# Patient Record
Sex: MALE | Race: WHITE | NOT HISPANIC OR LATINO | Employment: OTHER | URBAN - METROPOLITAN AREA
[De-identification: names, ages, dates, MRNs, and addresses within clinical notes are randomized per-mention and may not be internally consistent; named-entity substitution may affect disease eponyms.]

---

## 2018-05-15 ENCOUNTER — CONVERSION ENCOUNTER (OUTPATIENT)
Dept: URGENT CARE | Facility: CLINIC | Age: 68
End: 2018-05-15

## 2019-06-03 VITALS
DIASTOLIC BLOOD PRESSURE: 80 MMHG | OXYGEN SATURATION: 100 % | SYSTOLIC BLOOD PRESSURE: 148 MMHG | WEIGHT: 211.38 LBS | BODY MASS INDEX: 33.97 KG/M2 | HEIGHT: 66 IN | HEART RATE: 89 BPM

## 2019-08-22 ENCOUNTER — OFFICE VISIT (OUTPATIENT)
Dept: NEUROLOGY | Facility: CLINIC | Age: 69
End: 2019-08-22

## 2019-08-22 VITALS
SYSTOLIC BLOOD PRESSURE: 136 MMHG | HEIGHT: 66 IN | HEART RATE: 85 BPM | WEIGHT: 214 LBS | BODY MASS INDEX: 34.39 KG/M2 | DIASTOLIC BLOOD PRESSURE: 77 MMHG

## 2019-08-22 DIAGNOSIS — G25.0 ESSENTIAL TREMOR: Primary | ICD-10-CM
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
## 2019-08-22 PROBLEM — E11.9 TYPE 2 DIABETES MELLITUS WITHOUT COMPLICATIONS: Status: ACTIVE | Noted: 2018-05-15

## 2019-08-22 PROBLEM — I10 HYPERTENSION: Status: ACTIVE | Noted: 2018-05-15

## 2019-08-22 PROCEDURE — 99203 OFFICE O/P NEW LOW 30 MIN: CPT | Performed by: PSYCHIATRY & NEUROLOGY

## 2019-08-22 RX ORDER — PRIMIDONE 50 MG/1
TABLET ORAL
Qty: 60 TABLET | Refills: 11 | Status: SHIPPED | OUTPATIENT
Start: 2019-08-22 | End: 2020-08-21

## 2019-08-22 RX ORDER — ASPIRIN 81 MG/1
81 TABLET, CHEWABLE ORAL EVERY OTHER DAY
COMMUNITY

## 2019-08-22 RX ORDER — SIMVASTATIN 40 MG
40 TABLET ORAL DAILY
Refills: 2 | COMMUNITY
Start: 2019-08-15

## 2019-08-22 RX ORDER — LISINOPRIL 10 MG/1
10 TABLET ORAL DAILY
Refills: 1 | COMMUNITY
Start: 2019-07-05

## 2019-08-22 RX ORDER — EZETIMIBE 10 MG/1
10 TABLET ORAL DAILY
Refills: 1 | COMMUNITY
Start: 2019-08-10

## 2019-08-22 RX ORDER — PRIMIDONE 50 MG/1
TABLET ORAL
Qty: 60 TABLET | Refills: 11 | Status: SHIPPED | OUTPATIENT
Start: 2019-08-22 | End: 2019-08-22 | Stop reason: SDUPTHER

## 2019-08-22 RX ORDER — IBUPROFEN 200 MG
200 TABLET ORAL EVERY 6 HOURS PRN
COMMUNITY

## 2019-08-22 NOTE — PROGRESS NOTES
Subjective:     Patient ID: Tone Weber is a 69 y.o. male.    New patient referred by Dr. Urbina for tremors.      Patient states both hands shake started over 20 years ago.   Now getting worse.  Mostly an action tremor, such as while eating, driving.  Unable to use as small screw . Now notes a little resting tremor.    Per his wife his sense of smell is poor. (always has had a poor sense of smell)  No dream motor activity.  Balance is fine. No trouble walking . No chronic constipation.  No trouble swallowing, rolling over in bed or walking up or down stairs    He had aneurysm repair 29 years ago with a metal ferrous clip.  Had a ruptured aneurysm.    Father has Parkinson's.  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  Symptoms started in his mid to late 50's ,  age 69 from MI, was in wheel chair. His tremor was resting tremor. He also had a stroke. No one else with tremor.  pts sisters do not have a tremor. No tremors in grandparents.       The following portions of the patient's history were reviewed and updated as appropriate: allergies, current medications, past family history, past medical history, past social history, past surgical history and problem list.      Family History   Problem Relation Age of Onset   • Heart disease Father    • Diabetes Father    • Parkinsonism Father        Past Medical History:   Diagnosis Date   • Diabetes (CMS/[facility])    • Dysphonia of organic tremor    • HTN (hypertension)    • Hyperlipidemia    • Hypertension    • Macrocytosis    • Renal insufficiency        Social History     Socioeconomic History   • Marital status:      Spouse name: Not on file   • Number of children: Not on file   • Years of education: Not on file   • Highest education level: Not on file   Tobacco Use   • Smoking status: Former Smoker   • Smokeless tobacco: Never Used   Substance and Sexual Activity   • Alcohol use: No     Frequency: Never   • Drug use: No         Current Outpatient  Medications:   •  aspirin 81 MG chewable tablet, Chew 81 mg Daily., Disp: , Rfl:   •  ezetimibe (ZETIA) 10 MG tablet, Take 10 mg by mouth Daily., Disp: , Rfl: 1  •  ibuprofen (ADVIL,MOTRIN) 200 MG tablet, Take 200 mg by mouth Every 6 (Six) Hours As Needed for Mild Pain ., Disp: , Rfl:   •  lisinopril (PRINIVIL,ZESTRIL) 10 MG tablet, Take 10 mg by mouth Daily., Disp: , Rfl: 1  •  metFORMIN (GLUCOPHAGE) 1000 MG tablet, TK 1 T PO BID WITH THE MORNING AND ASHLEE MEAL, Disp: , Rfl: 1  •  simvastatin (ZOCOR) 40 MG tablet, Take 40 mg by mouth Daily., Disp: , Rfl: 2    Review of Systems   Constitutional: Negative for appetite change, fatigue and fever.   HENT: Negative for postnasal drip and sinus pain.    Eyes: Negative for discharge and itching.   Respiratory: Negative for chest tightness and shortness of breath.    Cardiovascular: Negative for chest pain and leg swelling.   Gastrointestinal: Negative for constipation and diarrhea.   Endocrine: Negative for cold intolerance and heat intolerance.   Genitourinary: Negative for frequency and urgency.   Musculoskeletal: Negative for gait problem and joint swelling.   Neurological: Positive for tremors. Negative for syncope, light-headedness and headaches.   Psychiatric/Behavioral: Negative for behavioral problems, confusion and sleep disturbance.        I have reviewed ROS completed by medical assistant.     Objective:    Neurologic Exam     Mental Status   Oriented to person, place, and time.   Attention: normal. Concentration: normal.   Level of consciousness: alert  Knowledge: good.     Cranial Nerves   Cranial nerves II through XII intact.     CN III, IV, VI   Pupils are equal, round, and reactive to light.  Extraocular motions are normal.     Motor Exam   Muscle bulk: normal  Right arm tone: increased (possible cog wheel rigidity left >right)  Left arm tone: increased  Right arm pronator drift: absent    Strength   Strength 5/5 throughout.     Sensory Exam   Light touch  normal.   Vibration normal.     Gait, Coordination, and Reflexes     Gait  Gait: (decreassed arm swing on the left)    Coordination   Romberg: negative    Reflexes   Right biceps: 2+  Left biceps: 2+  Right patellar: 2+  Left patellar: 2+  Right achilles: 3+  Left achilles: 2+      Physical Exam   Constitutional: He is oriented to person, place, and time. He appears well-nourished.   HENT:   Head: Normocephalic.   Nose: Nose normal.   Mouth/Throat: Oropharynx is clear and moist.   Eyes: Conjunctivae and EOM are normal. Pupils are equal, round, and reactive to light.   Cardiovascular: Normal rate, regular rhythm and normal heart sounds.   Pulmonary/Chest: Effort normal and breath sounds normal.   Musculoskeletal: Normal range of motion. He exhibits no edema or deformity.   Neurological: He is oriented to person, place, and time. He has normal strength. He has a normal Romberg Test.   Reflex Scores:       Bicep reflexes are 2+ on the right side and 2+ on the left side.       Patellar reflexes are 2+ on the right side and 2+ on the left side.       Achilles reflexes are 3+ on the right side and 2+ on the left side.  Psychiatric: He has a normal mood and affect. His behavior is normal. Thought content normal.   Vitals reviewed.      Assessment/Plan:    Tone was seen today for tremors.    Diagnoses and all orders for this visit:    Essential tremor    The history is consistent with essential tremor. The minimal resting tremor and family history raises the possibility or early parkinson. Consider HANNAH scan but defer due to cost.   Will Rx mysoline for the tremor.   Return in one year for re evaluation.      This document has been electronically signed by Joseph Seipel, MD on August 22, 2019 1:26 PM

## 2020-03-25 ENCOUNTER — LAB REQUISITION (OUTPATIENT)
Dept: LAB | Facility: HOSPITAL | Age: 70
End: 2020-03-25

## 2020-03-25 DIAGNOSIS — Z00.00 ENCOUNTER FOR GENERAL ADULT MEDICAL EXAMINATION WITHOUT ABNORMAL FINDINGS: ICD-10-CM

## 2020-03-25 PROCEDURE — 87635 SARS-COV-2 COVID-19 AMP PRB: CPT | Performed by: EMERGENCY MEDICINE

## 2020-03-25 PROCEDURE — U0003 INFECTIOUS AGENT DETECTION BY NUCLEIC ACID (DNA OR RNA); SEVERE ACUTE RESPIRATORY SYNDROME CORONAVIRUS 2 (SARS-COV-2) (CORONAVIRUS DISEASE [COVID-19]), AMPLIFIED PROBE TECHNIQUE, MAKING USE OF HIGH THROUGHPUT TECHNOLOGIES AS DESCRIBED BY CMS-2020-01-R: HCPCS | Performed by: EMERGENCY MEDICINE

## 2020-04-06 LAB — SARS-COV-2 RNA RESP QL NAA+PROBE: DETECTED

## 2020-04-07 ENCOUNTER — HOSPITAL ENCOUNTER (INPATIENT)
Facility: HOSPITAL | Age: 70
LOS: 5 days | Discharge: HOME | End: 2020-04-12
Attending: INTERNAL MEDICINE | Admitting: INTERNAL MEDICINE

## 2020-04-07 ENCOUNTER — APPOINTMENT (OUTPATIENT)
Dept: GENERAL RADIOLOGY | Facility: HOSPITAL | Age: 70
End: 2020-04-07

## 2020-04-07 DIAGNOSIS — U07.1 PNEUMONIA DUE TO COVID-19 VIRUS: ICD-10-CM

## 2020-04-07 DIAGNOSIS — U07.1 COVID-19 VIRUS DETECTED: ICD-10-CM

## 2020-04-07 DIAGNOSIS — J12.82 PNEUMONIA DUE TO COVID-19 VIRUS: ICD-10-CM

## 2020-04-07 DIAGNOSIS — R06.00 DYSPNEA, UNSPECIFIED TYPE: Primary | ICD-10-CM

## 2020-04-07 DIAGNOSIS — A41.9 SEPSIS, DUE TO UNSPECIFIED ORGANISM, UNSPECIFIED WHETHER ACUTE ORGAN DYSFUNCTION PRESENT (HCC): ICD-10-CM

## 2020-04-07 PROBLEM — R25.1 DYSPHONIA OF ORGANIC TREMOR: Chronic | Status: ACTIVE | Noted: 2020-04-07

## 2020-04-07 PROBLEM — E78.5 HYPERLIPIDEMIA: Chronic | Status: ACTIVE | Noted: 2020-04-07

## 2020-04-07 PROBLEM — I48.91 NEW ONSET ATRIAL FIBRILLATION (HCC): Status: ACTIVE | Noted: 2020-04-07

## 2020-04-07 PROBLEM — R49.0 DYSPHONIA OF ORGANIC TREMOR: Chronic | Status: ACTIVE | Noted: 2020-04-07

## 2020-04-07 PROBLEM — J96.01 ACUTE RESPIRATORY FAILURE WITH HYPOXIA: Status: ACTIVE | Noted: 2020-04-07

## 2020-04-07 PROBLEM — E87.1 HYPONATREMIA: Status: ACTIVE | Noted: 2020-04-07

## 2020-04-07 LAB
ALBUMIN SERPL-MCNC: 3.9 G/DL (ref 3.5–5.2)
ALBUMIN/GLOB SERPL: 0.9 G/DL
ALP SERPL-CCNC: 166 U/L (ref 39–117)
ALT SERPL W P-5'-P-CCNC: 91 U/L (ref 1–41)
ANION GAP SERPL CALCULATED.3IONS-SCNC: 17 MMOL/L (ref 5–15)
ANISOCYTOSIS BLD QL: NORMAL
ARTERIAL PATENCY WRIST A: POSITIVE
AST SERPL-CCNC: 73 U/L (ref 1–40)
ATMOSPHERIC PRESS: ABNORMAL MM[HG]
BACTERIA UR QL AUTO: ABNORMAL /HPF
BASE EXCESS BLDA CALC-SCNC: -2.9 MMOL/L (ref 0–3)
BASOPHILS # BLD AUTO: 0 10*3/MM3 (ref 0–0.2)
BASOPHILS NFR BLD AUTO: 0.1 % (ref 0–1.5)
BDY SITE: ABNORMAL
BILIRUB SERPL-MCNC: 0.5 MG/DL (ref 0.2–1.2)
BILIRUB UR QL STRIP: NEGATIVE
BUN BLD-MCNC: 11 MG/DL (ref 8–23)
BUN/CREAT SERPL: 9.2 (ref 7–25)
CALCIUM SPEC-SCNC: 8.5 MG/DL (ref 8.6–10.5)
CHLORIDE SERPL-SCNC: 90 MMOL/L (ref 98–107)
CLARITY UR: CLEAR
CO2 BLDA-SCNC: 20.6 MMOL/L (ref 22–29)
CO2 SERPL-SCNC: 21 MMOL/L (ref 22–29)
COLOR UR: YELLOW
CREAT BLD-MCNC: 1.19 MG/DL (ref 0.76–1.27)
CRP SERPL-MCNC: 18.03 MG/DL (ref 0–0.5)
D DIMER PPP FEU-MCNC: 1.04 MCGFEU/ML (ref 0.17–0.59)
D-LACTATE SERPL-SCNC: 1.6 MMOL/L (ref 0.5–2)
D-LACTATE SERPL-SCNC: 2.8 MMOL/L (ref 0.5–2)
DEPRECATED RDW RBC AUTO: 43.8 FL (ref 37–54)
EOSINOPHIL # BLD AUTO: 0 10*3/MM3 (ref 0–0.4)
EOSINOPHIL NFR BLD AUTO: 0.1 % (ref 0.3–6.2)
ERYTHROCYTE [DISTWIDTH] IN BLOOD BY AUTOMATED COUNT: 13.8 % (ref 12.3–15.4)
FERRITIN SERPL-MCNC: 1258 NG/ML (ref 30–400)
GFR SERPL CREATININE-BSD FRML MDRD: 60 ML/MIN/1.73
GLOBULIN UR ELPH-MCNC: 4.3 GM/DL
GLUCOSE BLD-MCNC: 182 MG/DL (ref 65–99)
GLUCOSE BLDC GLUCOMTR-MCNC: 142 MG/DL (ref 70–105)
GLUCOSE BLDC GLUCOMTR-MCNC: 182 MG/DL (ref 70–105)
GLUCOSE UR STRIP-MCNC: ABNORMAL MG/DL
HCO3 BLDA-SCNC: 19.7 MMOL/L (ref 21–28)
HCT VFR BLD AUTO: 36.8 % (ref 37.5–51)
HEMODILUTION: NO
HGB BLD-MCNC: 13.3 G/DL (ref 13–17.7)
HGB UR QL STRIP.AUTO: ABNORMAL
HOLD SPECIMEN: NORMAL
HOROWITZ INDEX BLD+IHG-RTO: <21 %
HYALINE CASTS UR QL AUTO: ABNORMAL /LPF
KETONES UR QL STRIP: NEGATIVE
LACTATE HOLD SPECIMEN: NORMAL
LARGE PLATELETS: NORMAL
LDH SERPL-CCNC: 437 U/L (ref 135–225)
LEUKOCYTE ESTERASE UR QL STRIP.AUTO: NEGATIVE
LYMPHOCYTES # BLD AUTO: 0.8 10*3/MM3 (ref 0.7–3.1)
LYMPHOCYTES NFR BLD AUTO: 7.4 % (ref 19.6–45.3)
MCH RBC QN AUTO: 32.7 PG (ref 26.6–33)
MCHC RBC AUTO-ENTMCNC: 36.3 G/DL (ref 31.5–35.7)
MCV RBC AUTO: 90.1 FL (ref 79–97)
MODALITY: ABNORMAL
MONOCYTES # BLD AUTO: 0.7 10*3/MM3 (ref 0.1–0.9)
MONOCYTES NFR BLD AUTO: 6.6 % (ref 5–12)
NEUTROPHILS # BLD AUTO: 9.3 10*3/MM3 (ref 1.7–7)
NEUTROPHILS NFR BLD AUTO: 85.8 % (ref 42.7–76)
NITRITE UR QL STRIP: NEGATIVE
NRBC BLD AUTO-RTO: 0 /100 WBC (ref 0–0.2)
NT-PROBNP SERPL-MCNC: 97.2 PG/ML (ref 5–900)
PCO2 BLDA: 27.6 MM HG (ref 35–48)
PH BLDA: 7.46 PH UNITS (ref 7.35–7.45)
PH UR STRIP.AUTO: 5.5 [PH] (ref 5–8)
PLATELET # BLD AUTO: 264 10*3/MM3 (ref 140–450)
PMV BLD AUTO: 9.5 FL (ref 6–12)
PO2 BLDA: 70.1 MM HG (ref 83–108)
POTASSIUM BLD-SCNC: 4.4 MMOL/L (ref 3.5–5.2)
PROCALCITONIN SERPL-MCNC: 0.5 NG/ML (ref 0.1–0.25)
PROT SERPL-MCNC: 8.2 G/DL (ref 6–8.5)
PROT UR QL STRIP: ABNORMAL
RBC # BLD AUTO: 4.08 10*6/MM3 (ref 4.14–5.8)
RBC # UR: ABNORMAL /HPF
REF LAB TEST METHOD: ABNORMAL
SAO2 % BLDCOA: 95.1 % (ref 94–98)
SODIUM BLD-SCNC: 128 MMOL/L (ref 136–145)
SP GR UR STRIP: 1.02 (ref 1–1.03)
SQUAMOUS #/AREA URNS HPF: ABNORMAL /HPF
TROPONIN T SERPL-MCNC: <0.01 NG/ML (ref 0–0.03)
UROBILINOGEN UR QL STRIP: ABNORMAL
WBC MORPH BLD: NORMAL
WBC NRBC COR # BLD: 10.8 10*3/MM3 (ref 3.4–10.8)
WBC UR QL AUTO: ABNORMAL /HPF

## 2020-04-07 PROCEDURE — 86140 C-REACTIVE PROTEIN: CPT | Performed by: NURSE PRACTITIONER

## 2020-04-07 PROCEDURE — 83880 ASSAY OF NATRIURETIC PEPTIDE: CPT | Performed by: NURSE PRACTITIONER

## 2020-04-07 PROCEDURE — 82803 BLOOD GASES ANY COMBINATION: CPT

## 2020-04-07 PROCEDURE — 87040 BLOOD CULTURE FOR BACTERIA: CPT | Performed by: NURSE PRACTITIONER

## 2020-04-07 PROCEDURE — 85025 COMPLETE CBC W/AUTO DIFF WBC: CPT | Performed by: NURSE PRACTITIONER

## 2020-04-07 PROCEDURE — 84145 PROCALCITONIN (PCT): CPT | Performed by: NURSE PRACTITIONER

## 2020-04-07 PROCEDURE — 99223 1ST HOSP IP/OBS HIGH 75: CPT | Performed by: INTERNAL MEDICINE

## 2020-04-07 PROCEDURE — 93005 ELECTROCARDIOGRAM TRACING: CPT | Performed by: NURSE PRACTITIONER

## 2020-04-07 PROCEDURE — 81001 URINALYSIS AUTO W/SCOPE: CPT | Performed by: NURSE PRACTITIONER

## 2020-04-07 PROCEDURE — 82728 ASSAY OF FERRITIN: CPT | Performed by: NURSE PRACTITIONER

## 2020-04-07 PROCEDURE — 85379 FIBRIN DEGRADATION QUANT: CPT | Performed by: NURSE PRACTITIONER

## 2020-04-07 PROCEDURE — 99284 EMERGENCY DEPT VISIT MOD MDM: CPT

## 2020-04-07 PROCEDURE — 84484 ASSAY OF TROPONIN QUANT: CPT | Performed by: NURSE PRACTITIONER

## 2020-04-07 PROCEDURE — 94640 AIRWAY INHALATION TREATMENT: CPT

## 2020-04-07 PROCEDURE — 82962 GLUCOSE BLOOD TEST: CPT

## 2020-04-07 PROCEDURE — 94799 UNLISTED PULMONARY SVC/PX: CPT

## 2020-04-07 PROCEDURE — 83605 ASSAY OF LACTIC ACID: CPT

## 2020-04-07 PROCEDURE — 80053 COMPREHEN METABOLIC PANEL: CPT | Performed by: NURSE PRACTITIONER

## 2020-04-07 PROCEDURE — 83615 LACTATE (LD) (LDH) ENZYME: CPT | Performed by: NURSE PRACTITIONER

## 2020-04-07 PROCEDURE — 63710000001 INSULIN LISPRO (HUMAN) PER 5 UNITS: Performed by: NURSE PRACTITIONER

## 2020-04-07 PROCEDURE — 36600 WITHDRAWAL OF ARTERIAL BLOOD: CPT

## 2020-04-07 PROCEDURE — 83036 HEMOGLOBIN GLYCOSYLATED A1C: CPT | Performed by: NURSE PRACTITIONER

## 2020-04-07 PROCEDURE — 85007 BL SMEAR W/DIFF WBC COUNT: CPT | Performed by: NURSE PRACTITIONER

## 2020-04-07 PROCEDURE — 71045 X-RAY EXAM CHEST 1 VIEW: CPT

## 2020-04-07 RX ORDER — CHOLECALCIFEROL (VITAMIN D3) 125 MCG
5 CAPSULE ORAL NIGHTLY PRN
Status: DISCONTINUED | OUTPATIENT
Start: 2020-04-07 | End: 2020-04-12 | Stop reason: HOSPADM

## 2020-04-07 RX ORDER — HYDROXYCHLOROQUINE SULFATE 200 MG/1
400 TABLET, FILM COATED ORAL EVERY 12 HOURS SCHEDULED
Status: COMPLETED | OUTPATIENT
Start: 2020-04-07 | End: 2020-04-08

## 2020-04-07 RX ORDER — HYDROCODONE BITARTRATE AND ACETAMINOPHEN 10; 325 MG/1; MG/1
1 TABLET ORAL EVERY 6 HOURS PRN
Status: DISCONTINUED | OUTPATIENT
Start: 2020-04-07 | End: 2020-04-12 | Stop reason: HOSPADM

## 2020-04-07 RX ORDER — HYDROXYCHLOROQUINE SULFATE 200 MG/1
200 TABLET, FILM COATED ORAL EVERY 12 HOURS SCHEDULED
Status: COMPLETED | OUTPATIENT
Start: 2020-04-08 | End: 2020-04-12

## 2020-04-07 RX ORDER — ACETAMINOPHEN 160 MG/5ML
650 SOLUTION ORAL EVERY 4 HOURS PRN
Status: DISCONTINUED | OUTPATIENT
Start: 2020-04-07 | End: 2020-04-12 | Stop reason: HOSPADM

## 2020-04-07 RX ORDER — BISACODYL 5 MG/1
5 TABLET, DELAYED RELEASE ORAL DAILY PRN
Status: DISCONTINUED | OUTPATIENT
Start: 2020-04-07 | End: 2020-04-12 | Stop reason: HOSPADM

## 2020-04-07 RX ORDER — LISINOPRIL 5 MG/1
10 TABLET ORAL DAILY
Status: DISCONTINUED | OUTPATIENT
Start: 2020-04-07 | End: 2020-04-12 | Stop reason: HOSPADM

## 2020-04-07 RX ORDER — ACETAMINOPHEN 500 MG
1000 TABLET ORAL ONCE
Status: COMPLETED | OUTPATIENT
Start: 2020-04-07 | End: 2020-04-07

## 2020-04-07 RX ORDER — HYDROCODONE BITARTRATE AND ACETAMINOPHEN 5; 325 MG/1; MG/1
1 TABLET ORAL EVERY 6 HOURS PRN
Status: DISCONTINUED | OUTPATIENT
Start: 2020-04-07 | End: 2020-04-12 | Stop reason: HOSPADM

## 2020-04-07 RX ORDER — DEXTROSE MONOHYDRATE 25 G/50ML
25 INJECTION, SOLUTION INTRAVENOUS
Status: DISCONTINUED | OUTPATIENT
Start: 2020-04-07 | End: 2020-04-12 | Stop reason: HOSPADM

## 2020-04-07 RX ORDER — SODIUM CHLORIDE 9 MG/ML
50 INJECTION, SOLUTION INTRAVENOUS CONTINUOUS
Status: DISCONTINUED | OUTPATIENT
Start: 2020-04-07 | End: 2020-04-09

## 2020-04-07 RX ORDER — ATORVASTATIN CALCIUM 20 MG/1
20 TABLET, FILM COATED ORAL DAILY
Status: DISCONTINUED | OUTPATIENT
Start: 2020-04-07 | End: 2020-04-12 | Stop reason: HOSPADM

## 2020-04-07 RX ORDER — ALUMINA, MAGNESIA, AND SIMETHICONE 2400; 2400; 240 MG/30ML; MG/30ML; MG/30ML
15 SUSPENSION ORAL EVERY 6 HOURS PRN
Status: DISCONTINUED | OUTPATIENT
Start: 2020-04-07 | End: 2020-04-12 | Stop reason: HOSPADM

## 2020-04-07 RX ORDER — ACETAMINOPHEN 650 MG/1
650 SUPPOSITORY RECTAL EVERY 4 HOURS PRN
Status: DISCONTINUED | OUTPATIENT
Start: 2020-04-07 | End: 2020-04-12 | Stop reason: HOSPADM

## 2020-04-07 RX ORDER — SODIUM CHLORIDE 0.9 % (FLUSH) 0.9 %
10 SYRINGE (ML) INJECTION AS NEEDED
Status: DISCONTINUED | OUTPATIENT
Start: 2020-04-07 | End: 2020-04-12 | Stop reason: HOSPADM

## 2020-04-07 RX ORDER — NICOTINE POLACRILEX 4 MG
15 LOZENGE BUCCAL
Status: DISCONTINUED | OUTPATIENT
Start: 2020-04-07 | End: 2020-04-12 | Stop reason: HOSPADM

## 2020-04-07 RX ORDER — ACETAMINOPHEN 325 MG/1
650 TABLET ORAL EVERY 4 HOURS PRN
Status: DISCONTINUED | OUTPATIENT
Start: 2020-04-07 | End: 2020-04-12 | Stop reason: HOSPADM

## 2020-04-07 RX ORDER — ONDANSETRON 4 MG/1
4 TABLET, FILM COATED ORAL EVERY 6 HOURS PRN
Status: DISCONTINUED | OUTPATIENT
Start: 2020-04-07 | End: 2020-04-12 | Stop reason: HOSPADM

## 2020-04-07 RX ORDER — AZITHROMYCIN 250 MG/1
500 TABLET, FILM COATED ORAL DAILY
Status: COMPLETED | OUTPATIENT
Start: 2020-04-07 | End: 2020-04-07

## 2020-04-07 RX ORDER — PRIMIDONE 50 MG/1
50 TABLET ORAL NIGHTLY
Status: DISCONTINUED | OUTPATIENT
Start: 2020-04-07 | End: 2020-04-12 | Stop reason: HOSPADM

## 2020-04-07 RX ORDER — ONDANSETRON 2 MG/ML
4 INJECTION INTRAMUSCULAR; INTRAVENOUS EVERY 6 HOURS PRN
Status: DISCONTINUED | OUTPATIENT
Start: 2020-04-07 | End: 2020-04-12 | Stop reason: HOSPADM

## 2020-04-07 RX ORDER — ALBUTEROL SULFATE 90 UG/1
2 AEROSOL, METERED RESPIRATORY (INHALATION)
Status: DISCONTINUED | OUTPATIENT
Start: 2020-04-07 | End: 2020-04-12 | Stop reason: HOSPADM

## 2020-04-07 RX ORDER — AZITHROMYCIN 250 MG/1
250 TABLET, FILM COATED ORAL EVERY 24 HOURS
Status: DISCONTINUED | OUTPATIENT
Start: 2020-04-08 | End: 2020-04-08

## 2020-04-07 RX ORDER — SODIUM CHLORIDE 0.9 % (FLUSH) 0.9 %
10 SYRINGE (ML) INJECTION EVERY 12 HOURS SCHEDULED
Status: DISCONTINUED | OUTPATIENT
Start: 2020-04-07 | End: 2020-04-12 | Stop reason: HOSPADM

## 2020-04-07 RX ORDER — BISACODYL 10 MG
10 SUPPOSITORY, RECTAL RECTAL DAILY PRN
Status: DISCONTINUED | OUTPATIENT
Start: 2020-04-07 | End: 2020-04-12 | Stop reason: HOSPADM

## 2020-04-07 RX ADMIN — INSULIN LISPRO 2 UNITS: 100 INJECTION, SOLUTION INTRAVENOUS; SUBCUTANEOUS at 20:58

## 2020-04-07 RX ADMIN — HYDROXYCHLOROQUINE SULFATE 400 MG: 200 TABLET, FILM COATED ORAL at 20:57

## 2020-04-07 RX ADMIN — SODIUM CHLORIDE 1000 ML: 900 INJECTION, SOLUTION INTRAVENOUS at 12:23

## 2020-04-07 RX ADMIN — SODIUM CHLORIDE 100 ML/HR: 900 INJECTION, SOLUTION INTRAVENOUS at 18:25

## 2020-04-07 RX ADMIN — ATORVASTATIN CALCIUM 20 MG: 20 TABLET, FILM COATED ORAL at 17:31

## 2020-04-07 RX ADMIN — LISINOPRIL 10 MG: 5 TABLET ORAL at 17:31

## 2020-04-07 RX ADMIN — Medication 10 ML: at 21:52

## 2020-04-07 RX ADMIN — PRIMIDONE 50 MG: 50 TABLET ORAL at 20:57

## 2020-04-07 RX ADMIN — AZITHROMYCIN MONOHYDRATE 500 MG: 250 TABLET ORAL at 17:31

## 2020-04-07 RX ADMIN — ALBUTEROL SULFATE 2 PUFF: 90 AEROSOL, METERED RESPIRATORY (INHALATION) at 18:58

## 2020-04-07 RX ADMIN — ACETAMINOPHEN 1000 MG: 500 TABLET, FILM COATED ORAL at 14:33

## 2020-04-07 RX ADMIN — Medication 10 ML: at 14:33

## 2020-04-07 NOTE — ED NOTES
POC lactic performed plasma lactic not needed per provider     Romina Acevedo, RN  04/07/20 4223

## 2020-04-07 NOTE — PLAN OF CARE
Problem: Patient Care Overview  Goal: Plan of Care Review  Outcome: Ongoing (interventions implemented as appropriate)  Flowsheets (Taken 4/7/2020 1583)  Progress: improving  Outcome Summary: Pt given 1000mL NS bolus, Pt on 2L oxygen per NC with O2 of 97-99%, pt states he feels better, tylenol 1000mg given for 101.7 temperature, zithromax started. Continue to monitor.

## 2020-04-07 NOTE — H&P
[facility]      Patient Name: Tone Weber  : 1950  MRN: [id number]  Primary Care Physician: Ramses Auguste MD  Date of admission: 2020    Patient Care Team:  Ramses Auguste MD as PCP - General (Family Medicine)          Subjective   History Present Illness     Chief Complaint:   Chief Complaint   Patient presents with   • increased SOA Positive COVID test from Health Dept       Mr. Weber is a 69 y.o. male with PMH of HTN, HLD, DM II, and essential tremors who presented to [facility] ER 20 with complaints of increased shortness of breath and weakness. He has positive Covid-19 infection resulted on 20. He has had intermittent fever, cough, mild shortness of breath, and intermittent diarrhea for the last 2 weeks. Originally his PCP collected flu and strep swabs that were negative. He was referred to the health department and had Covid-19 swab on 3/25/20. He has been taking advil every 4 hours as needed for fever. He stated his shortness of breath has worsened over the last couple of days and became severe today. He stated he could not recline and had to sit or stand straight up in order to breathe. He became very short of breath and weak after taking a shower. His wife brought him to the ER.     In the ER the patient required supplemental O2 2L. ABG reviewed with ph 7.46, CO2 27.6, O2 70, HCO3 19.7. CXR showed patchy linear opacity lung bases could be seen with atelectasis or developing pneumonia. Chronic changes are also not excluded. EKG showed atrial fibrillation, rate controlled at 97. He denied any previous history of atrial fibrillation. Labs reviewed and showed labs consistent with Covid-19 with normal WBC 10.8, low lymphocytes 7.4, procalcitonin 0.50, CRP 18. Temperature was 100. He was given 1G tylenol and 1L IVFs. He was admitted for further treatment of acute respiratory failure requiring supplemental oxygen secondary to Covid-19, developing  pneumonia.     Review of Systems   Constitution: Positive for fever and malaise/fatigue.   HENT: Negative.    Eyes: Negative.    Cardiovascular: Negative.    Respiratory: Positive for cough and shortness of breath.    Endocrine: Negative.    Hematologic/Lymphatic: Negative.    Skin: Negative.    Musculoskeletal: Negative.    Gastrointestinal: Positive for diarrhea.   Genitourinary: Negative.    Neurological: Positive for weakness.   Psychiatric/Behavioral: Negative.    Allergic/Immunologic: Negative.    All other systems reviewed and are negative.          Personal History     Past Medical History:   Past Medical History:   Diagnosis Date   • Diabetes (CMS/HCC)    • Dysphonia of organic tremor    • HTN (hypertension)    • Hyperlipidemia    • Hypertension    • Macrocytosis    • Renal insufficiency        Surgical History:    History reviewed. No pertinent surgical history.        Family History: family history includes Diabetes in his father; Heart disease in his father; Parkinsonism in his father.     Social History:  reports that he has quit smoking. He has never used smokeless tobacco. He reports that he does not drink alcohol or use drugs.      Medications:  Prior to Admission medications    Medication Sig Start Date End Date Taking? Authorizing Provider   ezetimibe (ZETIA) 10 MG tablet Take 10 mg by mouth Daily. 8/10/19  Yes Lindy Hidalgo MD   lisinopril (PRINIVIL,ZESTRIL) 10 MG tablet Take 10 mg by mouth Daily. 7/5/19  Yes Lindy Hidalgo MD   metFORMIN (GLUCOPHAGE) 1000 MG tablet Take 1,000 mg by mouth 2 (Two) Times a Day With Meals.   Yes Lindy Hidalgo MD   primidone (MYSOLINE) 50 MG tablet TAKE 1 TABLET BY MOUTH AT BEDTIME. MAY INCREASE TO 2 TABLET AT BEDTIME AFTER 2 WEEKS 8/22/19  Yes Seipel, Joseph F, MD   simvastatin (ZOCOR) 40 MG tablet Take 40 mg by mouth Daily. 8/15/19  Yes Lindy Hidalgo MD   aspirin 81 MG chewable tablet Chew 81 mg Daily.    Lindy Hidalgo MD      ibuprofen (ADVIL,MOTRIN) 200 MG tablet Take 200 mg by mouth Every 6 (Six) Hours As Needed for Mild Pain .    ProviderLindy MD   metFORMIN (GLUCOPHAGE) 1000 MG tablet TK 1 T PO BID WITH THE MORNING AND ASHLEE MEAL 7/5/19 4/7/20  Provider, MD Lindy       Allergies:  No Known Allergies    Objective   Objective     Vital Signs  Temp:  [100 °F (37.8 °C)] 100 °F (37.8 °C)  Heart Rate:  [103-112] 103  Resp:  [20-36] 20  BP: (163-200)/(68-80) 163/68  SpO2:  [89 %-100 %] 100 %  on  Flow (L/min):  [2] 2;      Body mass index is 30.05 kg/m².    Physical Exam   Constitutional: He is oriented to person, place, and time. He appears well-developed and well-nourished. No distress.   HENT:   Head: Normocephalic and atraumatic.   Right Ear: External ear normal.   Left Ear: External ear normal.   Nose: Nose normal.   Mouth/Throat: Oropharynx is clear and moist. No oropharyngeal exudate.   Eyes: Pupils are equal, round, and reactive to light. Conjunctivae and EOM are normal. Right eye exhibits no discharge. Left eye exhibits no discharge. No scleral icterus.   Neck: Normal range of motion. Neck supple. No JVD present. No tracheal deviation present. No thyromegaly present.   Cardiovascular: Normal rate, normal heart sounds and intact distal pulses. Exam reveals no gallop and no friction rub.   No murmur heard.  Tachycardic on exertion   Pulmonary/Chest: Effort normal. No stridor. No respiratory distress. He has no wheezes. He has no rales. He exhibits no tenderness.   Few diffuse rhonchi were heard.  There is no fremitus or egophony.   Abdominal: Soft. Bowel sounds are normal. He exhibits no distension and no mass. There is no tenderness. There is no rebound and no guarding. No hernia.   Musculoskeletal: Normal range of motion. He exhibits no edema, tenderness or deformity.   Lymphadenopathy:     He has no cervical adenopathy.   Neurological: He is alert and oriented to person, place, and time. No cranial nerve deficit or  sensory deficit. He exhibits normal muscle tone. Coordination normal.   Skin: Skin is warm and dry. No rash noted. He is not diaphoretic. No erythema.   Psychiatric: He has a normal mood and affect. His behavior is normal.   Nursing note and vitals reviewed.       Results Review:  I have personally reviewed most recent cardiac tracings, lab results, microbiology results and radiology images and interpretations and agree with findings.     Results from last 7 days   Lab Units 04/07/20  1148   WBC 10*3/mm3 10.80   HEMOGLOBIN g/dL 13.3   HEMATOCRIT % 36.8*   PLATELETS 10*3/mm3 264     Results from last 7 days   Lab Units 04/07/20  1148   SODIUM mmol/L 128*   POTASSIUM mmol/L 4.4   CHLORIDE mmol/L 90*   CO2 mmol/L 21.0*   BUN mg/dL 11   CREATININE mg/dL 1.19   GLUCOSE mg/dL 182*   CALCIUM mg/dL 8.5*   ALT (SGPT) U/L 91*   AST (SGOT) U/L 73*   TROPONIN T ng/mL <0.010   PROBNP pg/mL 97.2   PROCALCITONIN ng/mL 0.50*     Estimated Creatinine Clearance: 66.8 mL/min (by C-G formula based on SCr of 1.19 mg/dL).  Brief Urine Lab Results  (Last result in the past 365 days)      Color   Clarity   Blood   Leuk Est   Nitrite   Protein   CREAT   Urine HCG        04/07/20 1308 Yellow Clear Trace Negative Negative 100 mg/dL (2+)               Microbiology Results (last 10 days)     ** No results found for the last 240 hours. **          ECG/EMG Results (most recent)     Procedure Component Value Units Date/Time    ECG 12 Lead [[id number]] Collected:  04/07/20 1153     Updated:  04/07/20 1155    Narrative:       HEART RATE= 97  bpm  RR Interval= 617  ms  MO Interval=   ms  P Horizontal Axis=   deg  P Front Axis=   deg  QRSD Interval= 81  ms  QT Interval= 343  ms  QRS Axis= -51  deg  T Wave Axis= 70  deg  - ABNORMAL ECG -  Atrial fibrillation  LAD, consider left anterior fascicular block  Consider anterior infarct  No previous ECG available for comparison  Electronically Signed By:   Date and Time of Study: 2020-04-07 11:53:51                      Xr Chest 1 View    Result Date: 4/7/2020  No prior for comparison. Patchy linear opacity lung bases could be seen with atelectasis or developing pneumonia. Chronic changes are also not excluded.  Electronically Signed By-Marcella Downs MD On:4/7/2020 12:39 PM This report was finalized on [id number] by  Marcella Downs MD.        Estimated Creatinine Clearance: 66.8 mL/min (by C-G formula based on SCr of 1.19 mg/dL).    Assessment/Plan   Assessment/Plan       Active Hospital Problems    Diagnosis  POA   • **Acute respiratory failure with hypoxia (CMS/HCC) [J96.01]  Yes     Priority: High   • COVID-19 virus detected [U07.1]  Yes     Priority: High   • Pneumonia due to COVID-19 virus [U07.1, J12.89]  Yes     Priority: High   • New onset atrial fibrillation (CMS/HCC) [I48.91]  Yes     Priority: High   • Hyponatremia [E87.1]  Yes     Priority: Medium   • Hyperlipidemia [E78.5]  Yes   • Dysphonia of organic tremor [R25.1, R49.0]  Yes   • Hypertension [I10]  Yes   • Type 2 diabetes mellitus without complications (CMS/HCC) [E11.9]  Yes      Resolved Hospital Problems   No resolved problems to display.       Acute hypoxic respiratory failure secondary to Covid-19 infection, pneumonia  -requiring 2L supplemental O2 when normally on room air  -ABG reviewed with ph 7.46, CO2 27.6, O2 70, HCO3 19.7  -CXR showed patchy linear opacity lung bases could be seen with atelectasis or developing pneumonia.  -known positive Covid-19  -WBC 10.8, lymphocytes 7.4, procalcitonin 0.50, CRP 18, ferritin 1258,   -temp 100  -antipyretics, inhalers  -DuoNeb breathing treatments every 4 hours while awake as needed    Pneumonia due to Covid-19  -CXR as above  -start Zithromax  -supportive care as above    Positive Covid-19 infection  -swab from 3/25/20 resulted positive on 4/5/20 per pt report and medical chart review (from [facility])  -enhanced droplet precautions   -Treatment as above with the addition of  hydroxychloroquine 400 mg twice daily today, then 200 mg twice daily for 4 additional days    Atrial fibrillation - new onset  -EKG showed rate controlled afib 87  -Plts are normal but could worsen with Covid-19 infection so will hold off on lovenox for now    Hyponatremia  -Na 128  -given 1L IVFs in ER, will hold off on giving further fluids due to risk of fluid overload with Covid-19  -monitor BMP    Hypertension  -cont home lisinopril    Hyperlipidemia  -cont home statin    DM Type II  -hold metformin while inpatient. Add SSI AC/HS    Essential tremor  -cot home primidone         VTE Prophylaxis -   Mechanical Order History:      Ordered        04/07/20 1330  Place Sequential Compression Device  Once         04/07/20 1330  Maintain Sequential Compression Device  Continuous                 Pharmalogical Order History:     None          CODE STATUS:    Code Status and Medical Interventions:   Ordered at: 04/07/20 1330     Level Of Support Discussed With:    Patient     Code Status:    CPR     Medical Interventions (Level of Support Prior to Arrest):    Full       This patient has been examined wearing appropriate Personal Protective Equipment. 04/07/20 was worn to see the patient.      I discussed the patient's findings and my recommendations with patient.  Have seen and examined this patient personally and the findings are as outlined above.      Electronically signed by MIRIAN Valentin, 04/07/20, 1:30 PM.  [facility]ist Team

## 2020-04-07 NOTE — NURSING NOTE
Sent private chat message to confirm order for 500cc bolus and NS infusion of 125mL/hr. Dr. White wanted to cancel the 500mL bolus and change the infusion to 100mL/hr.

## 2020-04-07 NOTE — ED PROVIDER NOTES
Subjective   69-year-old  male presents to the emergency room with complaint of increased shortness of breath.  Patient states that he has been sick since March 25 at which time he had a coronavirus testing done by the state and was told that his test was positive on April 5.  Wife reports to nurse that today is the first day that patient was able to bathe and shower and when he got out of shower had increased trouble breathing and felt like he could not catch his breath.  He was reported to the nurse that wife brought him to ER at the encouragement of the primary care physician's office.  Onset: March 25  Location: Generalized body aches and chest tightness  Duration: 10 days plus  Character: Worsening shortness of breath  Aggravating/Alleviating Factors: Movement and exertion and bathing/Tylenol  Radiation: Total body  Severity: Severe            Review of Systems   Constitutional: Positive for activity change, appetite change, chills, fatigue and fever.   Respiratory: Positive for chest tightness and shortness of breath.    Gastrointestinal: Positive for nausea.   Genitourinary: Negative.    Musculoskeletal: Positive for arthralgias, back pain and myalgias.   Skin: Positive for pallor.   Neurological: Negative.    Hematological: Negative.    Psychiatric/Behavioral: Negative.        Past Medical History:   Diagnosis Date   • Diabetes (CMS/HCC)    • Dysphonia of organic tremor    • HTN (hypertension)    • Hyperlipidemia    • Hypertension    • Macrocytosis    • Renal insufficiency        No Known Allergies    History reviewed. No pertinent surgical history.    Family History   Problem Relation Age of Onset   • Heart disease Father    • Diabetes Father    • Parkinsonism Father        Social History     Socioeconomic History   • Marital status:      Spouse name: Not on file   • Number of children: Not on file   • Years of education: Not on file   • Highest education level: Not on file   Tobacco Use   •  Smoking status: Former Smoker   • Smokeless tobacco: Never Used   Substance and Sexual Activity   • Alcohol use: No     Frequency: Never   • Drug use: No           Objective   Physical Exam   Constitutional: He appears well-developed and well-nourished. He appears distressed.   HENT:   Head: Normocephalic and atraumatic.   Eyes: Pupils are equal, round, and reactive to light.   Neck: Normal range of motion. Neck supple.   Cardiovascular: S1 normal, S2 normal and normal heart sounds. An irregularly irregular rhythm present. Tachycardia present.   Pulses:       Carotid pulses are 1+ on the right side, and 1+ on the left side.       Radial pulses are 1+ on the right side, and 1+ on the left side.        Femoral pulses are 1+ on the right side, and 1+ on the left side.       Popliteal pulses are 1+ on the right side, and 1+ on the left side.        Dorsalis pedis pulses are 1+ on the right side, and 1+ on the left side.        Posterior tibial pulses are 1+ on the right side, and 1+ on the left side.   Pulmonary/Chest: Tachypnea noted. He is in respiratory distress. He has decreased breath sounds in the right lower field and the left lower field. He has wheezes in the right middle field and the left middle field. He has rhonchi in the right lower field. He has rales in the right lower field and the left lower field.   Skin: He is diaphoretic.   Vitals reviewed.      Procedures           ED Course        Xr Chest 1 View    Result Date: 4/7/2020  No prior for comparison. Patchy linear opacity lung bases could be seen with atelectasis or developing pneumonia. Chronic changes are also not excluded.  Electronically Signed By-Marcella Downs MD On:4/7/2020 12:39 PM This report was finalized on [id number] by  Marcella Downs MD.      Labs Reviewed   COMPREHENSIVE METABOLIC PANEL - Abnormal; Notable for the following components:       Result Value    Glucose 182 (*)     Sodium 128 (*)     Chloride 90 (*)     CO2 21.0 (*)      "Calcium 8.5 (*)     ALT (SGPT) 91 (*)     AST (SGOT) 73 (*)     Alkaline Phosphatase 166 (*)     eGFR Non African Amer 60 (*)     Anion Gap 17.0 (*)     All other components within normal limits    Narrative:     GFR Normal >60  Chronic Kidney Disease <60  Kidney Failure <15     D-DIMER, QUANTITATIVE - Abnormal; Notable for the following components:    D-Dimer, Quantitative 1.04 (*)     All other components within normal limits    Narrative:     Reference Range  --------------------------------------------------------------------     < 0.50   Negative Predictive Value  0.50-0.59   Indeterminate    >= 0.60   Probable VTE             A very low percentage of patients with DVT may yield D-Dimer results   below the cut-off of 0.50 MCGFEU/mL.  This is known to be more   prevalent in patients with distal DVT.             Results of this test should always be interpreted in conjunction with   the patient's medical history, clinical presentation and other   findings.  Clinical diagnosis should not be based on the result of   IN[place]OVANCE D-Dimer alone.   PROCALCITONIN - Abnormal; Notable for the following components:    Procalcitonin 0.50 (*)     All other components within normal limits    Narrative:     As a Marker for Sepsis (Non-Neonates):   1. <0.5 ng/mL represents a low risk of severe sepsis and/or septic shock.  1. >2 ng/mL represents a high risk of severe sepsis and/or septic shock.    As a Marker for Lower Respiratory Tract Infections that require antibiotic therapy:  PCT on Admission     Antibiotic Therapy             6-12 Hrs later  > 0.5                Strongly Recommended            >0.25 - <0.5         Recommended  0.1 - 0.25           Discouraged                   Remeasure/reassess PCT  <0.1                 Strongly Discouraged          Remeasure/reassess PCT      As 28 day mortality risk marker: \"Change in Procalcitonin Result\" (> 80 % or <=80 %) if Day 0 (or Day 1) and Day 4 values are available. Refer to " [email or web address]-pct-[email or web address]/   Change in PCT <=80 %   A decrease of PCT levels below or equal to 80 % defines a positive change in PCT test result representing a higher risk for 28-day all-cause mortality of patients diagnosed with severe sepsis or septic shock.  Change in PCT > 80 %   A decrease of PCT levels of more than 80 % defines a negative change in PCT result representing a lower risk for 28-day all-cause mortality of patients diagnosed with severe sepsis or septic shock.                Results may be falsely decreased if patient taking Biotin.    C-REACTIVE PROTEIN - Abnormal; Notable for the following components:    C-Reactive Protein 18.03 (*)     All other components within normal limits   CBC WITH AUTO DIFFERENTIAL - Abnormal; Notable for the following components:    RBC 4.08 (*)     Hematocrit 36.8 (*)     MCHC 36.3 (*)     Neutrophil % 85.8 (*)     Lymphocyte % 7.4 (*)     Eosinophil % 0.1 (*)     Neutrophils, Absolute 9.30 (*)     All other components within normal limits   BLOOD GAS, ARTERIAL - Abnormal; Notable for the following components:    pH, Arterial 7.462 (*)     pCO2, Arterial 27.6 (*)     pO2, Arterial 70.1 (*)     HCO3, Arterial 19.7 (*)     Base Excess, Arterial -2.9 (*)     CO2 Content 20.6 (*)     All other components within normal limits   BNP (IN-HOUSE) - Normal    Narrative:     Among patients with dyspnea, NT-proBNP is highly sensitive for the detection of acute congestive heart failure. In addition NT-proBNP of <300 pg/ml effectively rules out acute congestive heart failure with 99% negative predictive value.    Results may be falsely decreased if patient taking Biotin.     TROPONIN (IN-HOUSE) - Normal    Narrative:     Troponin T Reference Range:  <= 0.03 ng/mL-   Negative for AMI  >0.03 ng/mL-     Abnormal for myocardial necrosis.  Clinicians would have to utilize clinical acumen, EKG, Troponin and serial changes to determine if it is an Acute Myocardial Infarction or  myocardial injury due to an underlying chronic condition.       Results may be falsely decreased if patient taking Biotin.     BLOOD CULTURE   BLOOD CULTURE   SCAN SLIDE    Narrative:     Slide Reviewed     URINALYSIS W/ MICROSCOPIC IF INDICATED (NO CULTURE)   BLOOD GAS, ARTERIAL   POCT GLUCOSE FINGERSTICK   CBC AND DIFFERENTIAL    Narrative:     The following orders were created for panel order CBC & Differential.  Procedure                               Abnormality         Status                     ---------                               -----------         ------                     CBC Auto Differential[[id number]]        Abnormal            Final result                 Please view results for these tests on the individual orders.   EXTRA TUBES    Narrative:     The following orders were created for panel order Extra Tubes.  Procedure                               Abnormality         Status                     ---------                               -----------         ------                     Gold Top - SST[[id number]]                                   Final result                 Please view results for these tests on the individual orders.   GOLD TOP - SST       Medications   sodium chloride 0.9 % flush 10 mL (has no administration in time range)   sodium chloride 0.9 % bolus 1,000 mL (1,000 mL Intravenous New Bag 4/7/20 1223)       Peripheral IV established and blood work obtained.  2 L nasal cannula placed on upon arrival due to decreased sats at presentation to the ER and sustained 89 to 92% for the first 30 minutes of ER visit.  ABG obtained by respiratory therapist and shows PO2 of 70.1 and pH of 7 0.462, appears to be compensating at the time with further evidence of increased Sao2 to 98-99% after 2L NC administered.  Recommend admission to [facility] for further evaluation of  Respiratory assistance needed.                                       MDM  Number of Diagnoses or Management Options  COVID-19 virus  detected:   Dyspnea, unspecified type:   Sepsis, due to unspecified organism, unspecified whether acute organ dysfunction present (CMS/[facility]):      Amount and/or Complexity of Data Reviewed  Tests in the medicine section of CPT®: reviewed        Final diagnoses:   Dyspnea, unspecified type   Sepsis, due to unspecified organism, unspecified whether acute organ dysfunction present (CMS/[facility])   COVID-19 virus detected            Mana Page, APRN  04/07/20 1352

## 2020-04-08 LAB
ANION GAP SERPL CALCULATED.3IONS-SCNC: 14 MMOL/L (ref 5–15)
BASOPHILS # BLD AUTO: 0 10*3/MM3 (ref 0–0.2)
BASOPHILS NFR BLD AUTO: 0.3 % (ref 0–1.5)
BUN BLD-MCNC: 11 MG/DL (ref 8–23)
BUN/CREAT SERPL: 12.1 (ref 7–25)
CALCIUM SPEC-SCNC: 7.9 MG/DL (ref 8.6–10.5)
CHLORIDE SERPL-SCNC: 97 MMOL/L (ref 98–107)
CO2 SERPL-SCNC: 18 MMOL/L (ref 22–29)
CREAT BLD-MCNC: 0.91 MG/DL (ref 0.76–1.27)
DEPRECATED RDW RBC AUTO: 42.9 FL (ref 37–54)
EOSINOPHIL # BLD AUTO: 0 10*3/MM3 (ref 0–0.4)
EOSINOPHIL NFR BLD AUTO: 0.4 % (ref 0.3–6.2)
ERYTHROCYTE [DISTWIDTH] IN BLOOD BY AUTOMATED COUNT: 13.5 % (ref 12.3–15.4)
GFR SERPL CREATININE-BSD FRML MDRD: 82 ML/MIN/1.73
GLUCOSE BLD-MCNC: 159 MG/DL (ref 65–99)
GLUCOSE BLDC GLUCOMTR-MCNC: 134 MG/DL (ref 70–105)
GLUCOSE BLDC GLUCOMTR-MCNC: 147 MG/DL (ref 70–105)
GLUCOSE BLDC GLUCOMTR-MCNC: 156 MG/DL (ref 70–105)
GLUCOSE BLDC GLUCOMTR-MCNC: 178 MG/DL (ref 70–105)
HBA1C MFR BLD: 7.3 % (ref 3.5–5.6)
HCT VFR BLD AUTO: 31.7 % (ref 37.5–51)
HGB BLD-MCNC: 11.1 G/DL (ref 13–17.7)
LYMPHOCYTES # BLD AUTO: 1 10*3/MM3 (ref 0.7–3.1)
LYMPHOCYTES NFR BLD AUTO: 14 % (ref 19.6–45.3)
MCH RBC QN AUTO: 31.8 PG (ref 26.6–33)
MCHC RBC AUTO-ENTMCNC: 35 G/DL (ref 31.5–35.7)
MCV RBC AUTO: 90.9 FL (ref 79–97)
MONOCYTES # BLD AUTO: 0.7 10*3/MM3 (ref 0.1–0.9)
MONOCYTES NFR BLD AUTO: 9.9 % (ref 5–12)
NEUTROPHILS # BLD AUTO: 5.6 10*3/MM3 (ref 1.7–7)
NEUTROPHILS NFR BLD AUTO: 75.4 % (ref 42.7–76)
NRBC BLD AUTO-RTO: 0.1 /100 WBC (ref 0–0.2)
PLATELET # BLD AUTO: 249 10*3/MM3 (ref 140–450)
PMV BLD AUTO: 8.9 FL (ref 6–12)
POTASSIUM BLD-SCNC: 4.6 MMOL/L (ref 3.5–5.2)
RBC # BLD AUTO: 3.48 10*6/MM3 (ref 4.14–5.8)
SODIUM BLD-SCNC: 129 MMOL/L (ref 136–145)
WBC NRBC COR # BLD: 7.5 10*3/MM3 (ref 3.4–10.8)

## 2020-04-08 PROCEDURE — 63710000001 INSULIN LISPRO (HUMAN) PER 5 UNITS: Performed by: NURSE PRACTITIONER

## 2020-04-08 PROCEDURE — 94799 UNLISTED PULMONARY SVC/PX: CPT

## 2020-04-08 PROCEDURE — 80048 BASIC METABOLIC PNL TOTAL CA: CPT | Performed by: INTERNAL MEDICINE

## 2020-04-08 PROCEDURE — 82962 GLUCOSE BLOOD TEST: CPT

## 2020-04-08 PROCEDURE — 85025 COMPLETE CBC W/AUTO DIFF WBC: CPT | Performed by: INTERNAL MEDICINE

## 2020-04-08 PROCEDURE — 93005 ELECTROCARDIOGRAM TRACING: CPT | Performed by: INTERNAL MEDICINE

## 2020-04-08 PROCEDURE — 93010 ELECTROCARDIOGRAM REPORT: CPT | Performed by: INTERNAL MEDICINE

## 2020-04-08 PROCEDURE — 99232 SBSQ HOSP IP/OBS MODERATE 35: CPT | Performed by: INTERNAL MEDICINE

## 2020-04-08 RX ADMIN — Medication 10 ML: at 21:48

## 2020-04-08 RX ADMIN — ALBUTEROL SULFATE 2 PUFF: 90 AEROSOL, METERED RESPIRATORY (INHALATION) at 07:46

## 2020-04-08 RX ADMIN — Medication 10 ML: at 08:56

## 2020-04-08 RX ADMIN — INSULIN LISPRO 2 UNITS: 100 INJECTION, SOLUTION INTRAVENOUS; SUBCUTANEOUS at 12:54

## 2020-04-08 RX ADMIN — LISINOPRIL 10 MG: 5 TABLET ORAL at 08:56

## 2020-04-08 RX ADMIN — PRIMIDONE 50 MG: 50 TABLET ORAL at 21:18

## 2020-04-08 RX ADMIN — ALBUTEROL SULFATE 2 PUFF: 90 AEROSOL, METERED RESPIRATORY (INHALATION) at 15:57

## 2020-04-08 RX ADMIN — HYDROXYCHLOROQUINE SULFATE 400 MG: 200 TABLET, FILM COATED ORAL at 08:56

## 2020-04-08 RX ADMIN — SODIUM CHLORIDE 100 ML/HR: 900 INJECTION, SOLUTION INTRAVENOUS at 12:38

## 2020-04-08 RX ADMIN — ALBUTEROL SULFATE 2 PUFF: 90 AEROSOL, METERED RESPIRATORY (INHALATION) at 19:05

## 2020-04-08 RX ADMIN — ATORVASTATIN CALCIUM 20 MG: 20 TABLET, FILM COATED ORAL at 08:56

## 2020-04-08 RX ADMIN — AZITHROMYCIN MONOHYDRATE 250 MG: 250 TABLET ORAL at 15:20

## 2020-04-08 RX ADMIN — HYDROXYCHLOROQUINE SULFATE 200 MG: 200 TABLET, FILM COATED ORAL at 21:18

## 2020-04-08 RX ADMIN — ALBUTEROL SULFATE 2 PUFF: 90 AEROSOL, METERED RESPIRATORY (INHALATION) at 11:34

## 2020-04-08 RX ADMIN — SODIUM CHLORIDE 100 ML/HR: 900 INJECTION, SOLUTION INTRAVENOUS at 03:34

## 2020-04-08 RX ADMIN — INSULIN LISPRO 2 UNITS: 100 INJECTION, SOLUTION INTRAVENOUS; SUBCUTANEOUS at 21:18

## 2020-04-08 NOTE — PLAN OF CARE
Problem: Patient Care Overview  Goal: Plan of Care Review  Outcome: Ongoing (interventions implemented as appropriate)  Flowsheets (Taken 4/8/2020 0241)  Outcome Summary: Pt is getting IV fluids  NS at 100mL/hr.  Pt says he is breathing better.  He is still on 2L nasal cannula.  Will continue to monitor patient.     Problem: Patient Care Overview  Goal: Discharge Needs Assessment  Outcome: Ongoing (interventions implemented as appropriate)  Flowsheets  Taken 4/8/2020 0241 by Latonya Gibson RN  Equipment Needed After Discharge: other (see comments) (possible oxygen)  Anticipated Changes Related to Illness: none  Transportation Anticipated: family or friend will provide  Transportation Concerns: car, none  Concerns to be Addressed: no discharge needs identified  Readmission Within the Last 30 Days: no previous admission in last 30 days  Taken 4/7/2020 1440 by Jen Mahoney, RN  Equipment Currently Used at Home: glucometer  Taken 4/7/2020 1443 by Jen Mahoney, RN  Patient/Family Anticipated Services at Transition: none  Patient/Family Anticipates Transition to: home     Problem: Arrhythmia/Dysrhythmia (Symptomatic) (Adult)  Goal: Signs and Symptoms of Listed Potential Problems Will be Absent, Minimized or Managed (Arrhythmia/Dysrhythmia)  Outcome: Ongoing (interventions implemented as appropriate)  Flowsheets (Taken 4/8/2020 0241)  Problems Assessed (Arrhythmia/Dysrhythmia): all  Problems Present (Dysrhythmia): hypoxia/hypoxemia     Problem: Breathing Pattern Ineffective (Adult)  Goal: Identify Related Risk Factors and Signs and Symptoms  Outcome: Ongoing (interventions implemented as appropriate)  Flowsheets (Taken 4/8/2020 0241)  Related Risk Factors (Breathing Pattern Ineffective): fatigue; infection  Signs and Symptoms (Breathing Pattern Ineffective): breath sounds abnormal     Problem: Breathing Pattern Ineffective (Adult)  Goal: Effective Oxygenation/Ventilation  Outcome: Ongoing (interventions implemented  as appropriate)  Flowsheets (Taken 4/8/2020 0241)  Effective Oxygenation/Ventilation: making progress toward outcome     Problem: Breathing Pattern Ineffective (Adult)  Goal: Anxiety/Fear Reduction  Outcome: Ongoing (interventions implemented as appropriate)  Flowsheets (Taken 4/8/2020 0241)  Anxiety/Fear Reduction: making progress toward outcome

## 2020-04-08 NOTE — PLAN OF CARE
Problem: Patient Care Overview  Goal: Plan of Care Review  Outcome: Ongoing (interventions implemented as appropriate)  Flowsheets (Taken 4/8/2020 1507)  Progress: improving  Plan of Care Reviewed With: patient  Outcome Summary: Pt breathing better, O2 is 94-96% on 1L NC, NS @100mL/hr. will continue to monitor.

## 2020-04-08 NOTE — PROGRESS NOTES
"      [facility] Medicine Services Daily Progress Note      Hospitalist Team  LOS 1 days      Patient Care Team:  Ramses Auguste MD as PCP - General (Family Medicine)    Patient Location: 4128/1      Subjective   Subjective     Chief Complaint / Subjective  Chief Complaint   Patient presents with   • increased SOA Positive COVID test from [facility]         Brief Synopsis of Hospital Course/HPI  Patient is a 69-year-old diabetic with hypertension and hyperlipidemia who has been cultured positive since 3/25/2020.  The patient developed worsening dyspnea and was found to be relatively hypoxemic and was readmitted to the hospital with pneumonia.  The patient was placed on hydroxychloroquine per protocol.    Date:  4/8/2020  Overall the patient feels much better today.  He has not gotten up and moved around very much but when he does move around he feels much more short of breath.  Continues to have a dry nonproductive cough.    Review of Systems   Constitution: Positive for decreased appetite and malaise/fatigue.   HENT: Negative.    Eyes: Negative.    Cardiovascular: Positive for dyspnea on exertion.   Respiratory: Positive for cough and shortness of breath.    Endocrine: Negative.    Hematologic/Lymphatic: Negative.    Skin: Negative.    Musculoskeletal: Negative.    Gastrointestinal: Negative.    Genitourinary: Negative.    Neurological: Negative.    Psychiatric/Behavioral: Negative.    Allergic/Immunologic: Negative.    All other systems reviewed and are negative.    Objective   Objective      Vital Signs  Temp:  [97.8 °F (36.6 °C)-99.1 °F (37.3 °C)] 98.3 °F (36.8 °C)  Heart Rate:  [72-94] 72  Resp:  [14-23] 14  BP: (133-159)/(63-72) 136/72  Oxygen Therapy  SpO2: 95 %  Pulse Oximetry Type: Intermittent  Device (Oxygen Therapy): nasal cannula  Device (Oxygen Therapy): nasal cannula  Flow (L/min): 2  Flowsheet Rows      First Filed Value   Admission Height  177.8 cm (70\") Documented at " 04/07/2020 1119   Admission Weight  95 kg (209 lb 7 oz) Documented at 04/07/2020 1119        Intake & Output (last 3 days)       04/05 0701 - 04/06 0700 04/06 0701 - 04/07 0700 04/07 0701 - 04/08 0700 04/08 0701 - 04/09 0700    P.O.   1185 945    I.V. (mL/kg)   980 (10.3)     Total Intake(mL/kg)   2165 (22.8) 945 (10)    Urine (mL/kg/hr)   2250 2100 (2.4)    Total Output   2250 2100    Net   -85 -1155            Urine Unmeasured Occurrence   1 x         Lines, Drains & Airways    Active LDAs     Name:   Placement date:   Placement time:   Site:   Days:    Peripheral IV 04/07/20 1223 Right Antecubital   04/07/20    1223    Antecubital   1                  Physical Exam:    Physical Exam   Constitutional: He is oriented to person, place, and time. He appears well-developed and well-nourished. No distress.   HENT:   Head: Normocephalic and atraumatic.   Right Ear: External ear normal.   Left Ear: External ear normal.   Nose: Nose normal.   Mouth/Throat: Oropharynx is clear and moist. No oropharyngeal exudate.   Eyes: Pupils are equal, round, and reactive to light. Conjunctivae and EOM are normal. Right eye exhibits no discharge. Left eye exhibits no discharge. No scleral icterus.   Neck: Normal range of motion. No JVD present. No tracheal deviation present. No thyromegaly present.   Cardiovascular: Normal rate, regular rhythm, normal heart sounds and intact distal pulses. Exam reveals no gallop and no friction rub.   No murmur heard.  Pulmonary/Chest: Effort normal. No stridor. No respiratory distress. He has no wheezes. He has no rales. He exhibits no tenderness.   Few rhonchi on auscultation.   Abdominal: Soft. Bowel sounds are normal. He exhibits no distension and no mass. There is no tenderness. There is no rebound and no guarding. No hernia.   Musculoskeletal: Normal range of motion. He exhibits no edema, tenderness or deformity.   Lymphadenopathy:     He has no cervical adenopathy.   Neurological: He is alert and  oriented to person, place, and time. No cranial nerve deficit or sensory deficit. He exhibits normal muscle tone. Coordination normal.   Skin: Skin is warm and dry. No rash noted. He is not diaphoretic. No erythema.   Psychiatric: He has a normal mood and affect. His behavior is normal.   Nursing note and vitals reviewed.        Procedures:    Results Review:     I reviewed the patient's new clinical results.    Lab Results (last 24 hours)     Procedure Component Value Units Date/Time    Blood Culture - Blood, Arm, Left [[id number]] Collected:  04/07/20 1147    Specimen:  Blood from Arm, Left Updated:  04/08/20 1231     Blood Culture No growth at 24 hours    Blood Culture - Blood, Arm, Right [[id number]] Collected:  04/07/20 1147    Specimen:  Blood from Arm, Right Updated:  04/08/20 1231     Blood Culture No growth at 24 hours    POC Glucose Once [[id number]]  (Abnormal) Collected:  04/08/20 1135    Specimen:  Blood Updated:  04/08/20 1136     Glucose 156 mg/dL      Comment: Serial Number: [id number]:  [id number]       Hemoglobin A1c [[id number]]  (Abnormal) Collected:  04/07/20 1148    Specimen:  Blood Updated:  04/08/20 1116     Hemoglobin A1C 7.3 %     Narrative:       Hemoglobin A1C Reference Range:    <5.7 %        Normal  5.7-6.4 %     Increased risk for diabetes  > 6.4 %        Diabetes       These guidelines have been recommended by the American Diabetic Association for Hgb A1c.      The following 2010 guidelines have been recommended by the American Diabetes Association for Hemoglobin A1c.    HBA1c 5.7-6.4% Increased risk for future diabetes (pre-diabetes)  HBA1c     >6.4% Diabetes      POC Glucose Once [[id number]]  (Abnormal) Collected:  04/08/20 0812    Specimen:  Blood Updated:  04/08/20 0813     Glucose 134 mg/dL      Comment: Serial Number: [id number]:  [id number]       Basic Metabolic Panel [[id number]]  (Abnormal) Collected:  04/08/20 0256    Specimen:  Blood Updated:  04/08/20 0418     Glucose  159 mg/dL      BUN 11 mg/dL      Creatinine 0.91 mg/dL      Sodium 129 mmol/L      Potassium 4.6 mmol/L      Chloride 97 mmol/L      CO2 18.0 mmol/L      Calcium 7.9 mg/dL      eGFR Non African Amer 82 mL/min/1.73      BUN/Creatinine Ratio 12.1     Anion Gap 14.0 mmol/L     Narrative:       GFR Normal >60  Chronic Kidney Disease <60  Kidney Failure <15      CBC Auto Differential [[id number]]  (Abnormal) Collected:  04/08/20 0256    Specimen:  Blood Updated:  04/08/20 0410     WBC 7.50 10*3/mm3      RBC 3.48 10*6/mm3      Hemoglobin 11.1 g/dL      Comment: Result checked         Hematocrit 31.7 %      MCV 90.9 fL      MCH 31.8 pg      MCHC 35.0 g/dL      RDW 13.5 %      RDW-SD 42.9 fl      MPV 8.9 fL      Platelets 249 10*3/mm3      Neutrophil % 75.4 %      Lymphocyte % 14.0 %      Monocyte % 9.9 %      Eosinophil % 0.4 %      Basophil % 0.3 %      Neutrophils, Absolute 5.60 10*3/mm3      Lymphocytes, Absolute 1.00 10*3/mm3      Monocytes, Absolute 0.70 10*3/mm3      Eosinophils, Absolute 0.00 10*3/mm3      Basophils, Absolute 0.00 10*3/mm3      nRBC 0.1 /100 WBC     POC Glucose Once [[id number]]  (Abnormal) Collected:  04/07/20 2055    Specimen:  Blood Updated:  04/07/20 2057     Glucose 182 mg/dL      Comment: Serial Number: [id number]:  [id number]       Lactic Acid, Reflex [[id number]]  (Normal) Collected:  04/07/20 1839    Specimen:  Blood Updated:  04/07/20 1911     Lactate 1.6 mmol/L     Lactic Acid, Reflex Timer (This will reflex a repeat order 3-3:15 hours after ordered.) [[id number]] Collected:  04/07/20 1153    Specimen:  Blood Updated:  04/07/20 1831     Hold Tube Hold for add-ons.     Comment: Auto resulted.       POC Glucose Once [[id number]]  (Abnormal) Collected:  04/07/20 1757    Specimen:  Blood Updated:  04/07/20 1758     Glucose 142 mg/dL      Comment: Serial Number: [id number]:  [id number]           Hemoglobin A1C   Date Value Ref Range Status   04/07/2020 7.3 (H) 3.5 - 5.6 % Final                Results from last 7 days   Lab Units 04/07/20  1211   PH, ARTERIAL pH units 7.462*   PO2 ART mm Hg 70.1*   PCO2, ARTERIAL mm Hg 27.6*   HCO3 ART mmol/L 19.7*     No results found for: LIPASE  No results found for: CHOL, CHLPL, TRIG, HDL, LDL, LDLDIRECT    No results found for: INTRAOP, PREDX, FINALDX, COMDX    Microbiology Results (last 10 days)     Procedure Component Value - Date/Time    Blood Culture - Blood, Arm, Left [[id number]] Collected:  04/07/20 1147    Lab Status:  Preliminary result Specimen:  Blood from Arm, Left Updated:  04/08/20 1231     Blood Culture No growth at 24 hours    Blood Culture - Blood, Arm, Right [[id number]] Collected:  04/07/20 1147    Lab Status:  Preliminary result Specimen:  Blood from Arm, Right Updated:  04/08/20 1231     Blood Culture No growth at 24 hours        ECG/EMG Results (most recent)     Procedure Component Value Units Date/Time    ECG 12 Lead [[id number]] Collected:  04/07/20 1153     Updated:  04/07/20 1155    Narrative:       HEART RATE= 97  bpm  RR Interval= 617  ms  FL Interval=   ms  P Horizontal Axis=   deg  P Front Axis=   deg  QRSD Interval= 81  ms  QT Interval= 343  ms  QRS Axis= -51  deg  T Wave Axis= 70  deg  - ABNORMAL ECG -  Atrial fibrillation  LAD, consider left anterior fascicular block  Consider anterior infarct  No previous ECG available for comparison  Electronically Signed By:   Date and Time of Study: 2020-04-07 11:53:51    ECG 12 Lead [[id number]] Collected:  04/08/20 0724     Updated:  04/08/20 0730    Narrative:       HEART RATE= 73  bpm  RR Interval= 820  ms  FL Interval= 166  ms  P Horizontal Axis= -53  deg  P Front Axis= 79  deg  QRSD Interval= 88  ms  QT Interval= 399  ms  QRS Axis= -35  deg  T Wave Axis= 36  deg  - ABNORMAL ECG -  Sinus rhythm  Probable left atrial enlargement  Left axis deviation  Consider anteroseptal infarct  Electronically Signed By:   Date and Time of Study: 2020-04-08 07:24:38              Xr Chest 1 View    Result  Date: 4/7/2020  No prior for comparison. Patchy linear opacity lung bases could be seen with atelectasis or developing pneumonia. Chronic changes are also not excluded.  Electronically Signed By-Marcella Downs MD On:4/7/2020 12:39 PM This report was finalized on [id number] by  Marcella Downs MD.    Xrays, labs reviewed personally by physician.    Medication Review:   I have reviewed the patient's current medication list  Scheduled Meds    albuterol sulfate HFA 2 puff Inhalation 4x Daily - RT   atorvastatin 20 mg Oral Daily   azithromycin 250 mg Oral Q24H   hydroxychloroquine 200 mg Oral Q12H   insulin lispro 0-7 Units Subcutaneous 4x Daily With Meals & Nightly   lisinopril 10 mg Oral Daily   primidone 50 mg Oral Nightly   sodium chloride 10 mL Intravenous Q12H     Meds Infusions    Pharmacy Consult     sodium chloride 100 mL/hr Last Rate: 100 mL/hr (04/08/20 1238)     Meds PRN  •  acetaminophen **OR** acetaminophen **OR** acetaminophen  •  aluminum-magnesium hydroxide-simethicone  •  bisacodyl  •  bisacodyl  •  dextrose  •  dextrose  •  glucagon (human recombinant)  •  HYDROcodone-acetaminophen  •  HYDROcodone-acetaminophen  •  insulin lispro **AND** insulin lispro  •  magnesium hydroxide  •  melatonin  •  ondansetron **OR** ondansetron  •  Pharmacy Consult  •  [COMPLETED] Insert peripheral IV **AND** sodium chloride  •  sodium chloride    Assessment/Plan   Assessment/Plan     Active Hospital Problems    Diagnosis  POA   • **Acute respiratory failure with hypoxia (CMS/HCC) [J96.01]  Yes   • COVID-19 virus detected [U07.1]  Yes   • Pneumonia due to COVID-19 virus [U07.1, J12.89]  Yes   • Hyperlipidemia [E78.5]  Yes   • 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    MEDICAL DECISION MAKING COMPLEXITY BY PROBLEM:   1.  COVID 19 pneumonia  -Manifested by hypoxemia corrects with 2 L/min per nasal cannula of oxygen  -Continue antipyretics and inhalers  -Continue hydroxychloroquine as per protocol  -Discontinue azithromycin  -Repeat chest x-ray in a.m.    2.  New onset atrial fibrillation  -His rate is well controlled  -Patient is back in sinus rhythm at this time    3.  Hyponatremia  -Patient is now slightly hyponatremic with a sodium of 129 today  -Continue to follow    4.  Hypertension  -Continue home medications    5.  Hyperlipidemia  -Continue home statin    6.  Type 2 diabetes mellitus  -Sliding scale insulin with Accu-Cheks q. before meals and at bedtime  -Continue to hold metformin while in the hospital    7.  Essential tremor  -Continue primidone    VTE Prophylaxis -   Mechanical Order History:      Ordered        04/07/20 1330  Place Sequential Compression Device  Once         04/07/20 1330  Maintain Sequential Compression Device  Continuous                 Pharmalogical Order History:     None            Code Status -   Code Status and Medical Interventions:   Ordered at: 04/07/20 1330     Level Of Support Discussed With:    Patient     Code Status:    CPR     Medical Interventions (Level of Support Prior to Arrest):    Full     Discharge Planning    Destination      Coordination has not been started for this encounter.      Durable Medical Equipment      Coordination has not been started for this encounter.      Dialysis/Infusion      Coordination has not been started for this encounter.      Home Medical Care      Coordination has not been started for this encounter.      Therapy      Coordination has not been started for this encounter.      Community Resources      Coordination has not been started for this encounter.      Electronically signed by Shelbi White MD, 04/08/20, 16:06.  [facility] Hospitalist Team

## 2020-04-08 NOTE — PROGRESS NOTES
Discharge Planning Assessment   [facility]     Patient Name: Tone Weber  MRN: [id number]  Today's Date: 4/8/2020    Admit Date: 4/7/2020    Discharge Needs Assessment     Row Name 04/08/20 1557       Living Environment    Lives With  spouse    Current Living Arrangements  home/apartment/condo    Primary Care Provided by  self    Provides Primary Care For  no one    Family Caregiver if Needed  spouse    Quality of Family Relationships  helpful;supportive    Able to Return to Prior Arrangements  yes       Resource/Environmental Concerns    Resource/Environmental Concerns  none    Transportation Concerns  car, none       Transition Planning    Patient/Family Anticipates Transition to  home with family    Patient/Family Anticipated Services at Transition  none    Transportation Anticipated  family or friend will provide       Discharge Needs Assessment    Readmission Within the Last 30 Days  no previous admission in last 30 days    Concerns to be Addressed  denies needs/concerns at this time    Equipment Currently Used at Home  glucometer    Anticipated Changes Related to Illness  none    Equipment Needed After Discharge  -- possible oxygen    Provided Post Acute Provider List?  N/A        Discharge Plan     Row Name 04/08/20 1558       Plan    Plan  DC plan: home with family, watch for O2 needs. COVID positive    Plan Comments  due to isolation precautions and CM working off site spoke to patient via phone.  patient states independent and home and able to get around in room, states able to afford all medications and has PCP- Dr. Urbina.             Demographic Summary     Row Name 04/08/20 155       General Information    Admission Type  inpatient    Arrived From  home    Referral Source  admission list    Reason for Consult  discharge planning    Preferred Language  English     Used During This Interaction  no        Functional Status     Row Name 04/08/20 1556       Functional Status    Usual Activity  Tolerance  good    Current Activity Tolerance  good       Functional Status, IADL    Medications  independent    Meal Preparation  independent    Housekeeping  independent    Laundry  independent    Shopping  independent       Mental Status Summary    Recent Changes in Mental Status/Cognitive Functioning  no changes          Anne Anthony RN

## 2020-04-09 ENCOUNTER — APPOINTMENT (OUTPATIENT)
Dept: GENERAL RADIOLOGY | Facility: HOSPITAL | Age: 70
End: 2020-04-09

## 2020-04-09 LAB
ANION GAP SERPL CALCULATED.3IONS-SCNC: 12 MMOL/L (ref 5–15)
BASOPHILS # BLD AUTO: 0 10*3/MM3 (ref 0–0.2)
BASOPHILS NFR BLD AUTO: 0.4 % (ref 0–1.5)
BUN BLD-MCNC: 11 MG/DL (ref 8–23)
BUN/CREAT SERPL: 9.6 (ref 7–25)
CALCIUM SPEC-SCNC: 8.2 MG/DL (ref 8.6–10.5)
CHLORIDE SERPL-SCNC: 100 MMOL/L (ref 98–107)
CO2 SERPL-SCNC: 21 MMOL/L (ref 22–29)
CREAT BLD-MCNC: 1.15 MG/DL (ref 0.76–1.27)
DEPRECATED RDW RBC AUTO: 44.6 FL (ref 37–54)
EOSINOPHIL # BLD AUTO: 0.1 10*3/MM3 (ref 0–0.4)
EOSINOPHIL NFR BLD AUTO: 0.8 % (ref 0.3–6.2)
ERYTHROCYTE [DISTWIDTH] IN BLOOD BY AUTOMATED COUNT: 13.7 % (ref 12.3–15.4)
GFR SERPL CREATININE-BSD FRML MDRD: 63 ML/MIN/1.73
GLUCOSE BLD-MCNC: 192 MG/DL (ref 65–99)
GLUCOSE BLDC GLUCOMTR-MCNC: 134 MG/DL (ref 70–105)
GLUCOSE BLDC GLUCOMTR-MCNC: 135 MG/DL (ref 70–105)
GLUCOSE BLDC GLUCOMTR-MCNC: 147 MG/DL (ref 70–105)
GLUCOSE BLDC GLUCOMTR-MCNC: 154 MG/DL (ref 70–105)
HCT VFR BLD AUTO: 31.6 % (ref 37.5–51)
HGB BLD-MCNC: 10.7 G/DL (ref 13–17.7)
LYMPHOCYTES # BLD AUTO: 1.3 10*3/MM3 (ref 0.7–3.1)
LYMPHOCYTES NFR BLD AUTO: 18.8 % (ref 19.6–45.3)
MCH RBC QN AUTO: 31.2 PG (ref 26.6–33)
MCHC RBC AUTO-ENTMCNC: 33.9 G/DL (ref 31.5–35.7)
MCV RBC AUTO: 91.8 FL (ref 79–97)
MONOCYTES # BLD AUTO: 0.8 10*3/MM3 (ref 0.1–0.9)
MONOCYTES NFR BLD AUTO: 11.6 % (ref 5–12)
NEUTROPHILS # BLD AUTO: 4.6 10*3/MM3 (ref 1.7–7)
NEUTROPHILS NFR BLD AUTO: 68.4 % (ref 42.7–76)
NRBC BLD AUTO-RTO: 0.1 /100 WBC (ref 0–0.2)
NT-PROBNP SERPL-MCNC: 190.2 PG/ML (ref 5–900)
PLATELET # BLD AUTO: 296 10*3/MM3 (ref 140–450)
PMV BLD AUTO: 8.2 FL (ref 6–12)
POTASSIUM BLD-SCNC: 4.6 MMOL/L (ref 3.5–5.2)
RBC # BLD AUTO: 3.44 10*6/MM3 (ref 4.14–5.8)
SODIUM BLD-SCNC: 133 MMOL/L (ref 136–145)
WBC NRBC COR # BLD: 6.8 10*3/MM3 (ref 3.4–10.8)

## 2020-04-09 PROCEDURE — 82962 GLUCOSE BLOOD TEST: CPT

## 2020-04-09 PROCEDURE — 80048 BASIC METABOLIC PNL TOTAL CA: CPT | Performed by: INTERNAL MEDICINE

## 2020-04-09 PROCEDURE — 94799 UNLISTED PULMONARY SVC/PX: CPT

## 2020-04-09 PROCEDURE — 99232 SBSQ HOSP IP/OBS MODERATE 35: CPT | Performed by: INTERNAL MEDICINE

## 2020-04-09 PROCEDURE — 83880 ASSAY OF NATRIURETIC PEPTIDE: CPT | Performed by: INTERNAL MEDICINE

## 2020-04-09 PROCEDURE — 71045 X-RAY EXAM CHEST 1 VIEW: CPT

## 2020-04-09 PROCEDURE — 93005 ELECTROCARDIOGRAM TRACING: CPT | Performed by: INTERNAL MEDICINE

## 2020-04-09 PROCEDURE — 85025 COMPLETE CBC W/AUTO DIFF WBC: CPT | Performed by: INTERNAL MEDICINE

## 2020-04-09 RX ORDER — BUMETANIDE 0.25 MG/ML
1 INJECTION INTRAMUSCULAR; INTRAVENOUS ONCE
Status: COMPLETED | OUTPATIENT
Start: 2020-04-09 | End: 2020-04-09

## 2020-04-09 RX ADMIN — ALBUTEROL SULFATE 2 PUFF: 90 AEROSOL, METERED RESPIRATORY (INHALATION) at 10:52

## 2020-04-09 RX ADMIN — ATORVASTATIN CALCIUM 20 MG: 20 TABLET, FILM COATED ORAL at 09:05

## 2020-04-09 RX ADMIN — ALBUTEROL SULFATE 2 PUFF: 90 AEROSOL, METERED RESPIRATORY (INHALATION) at 15:06

## 2020-04-09 RX ADMIN — BUMETANIDE 1 MG: 0.25 INJECTION INTRAMUSCULAR; INTRAVENOUS at 18:35

## 2020-04-09 RX ADMIN — ALBUTEROL SULFATE 2 PUFF: 90 AEROSOL, METERED RESPIRATORY (INHALATION) at 06:45

## 2020-04-09 RX ADMIN — ALBUTEROL SULFATE 2 PUFF: 90 AEROSOL, METERED RESPIRATORY (INHALATION) at 19:31

## 2020-04-09 RX ADMIN — HYDROXYCHLOROQUINE SULFATE 200 MG: 200 TABLET, FILM COATED ORAL at 09:05

## 2020-04-09 RX ADMIN — Medication 10 ML: at 09:05

## 2020-04-09 RX ADMIN — HYDROXYCHLOROQUINE SULFATE 200 MG: 200 TABLET, FILM COATED ORAL at 20:26

## 2020-04-09 RX ADMIN — SODIUM CHLORIDE 50 ML/HR: 900 INJECTION, SOLUTION INTRAVENOUS at 02:40

## 2020-04-09 RX ADMIN — Medication 10 ML: at 20:26

## 2020-04-09 RX ADMIN — PRIMIDONE 50 MG: 50 TABLET ORAL at 20:26

## 2020-04-09 RX ADMIN — LISINOPRIL 10 MG: 5 TABLET ORAL at 09:05

## 2020-04-09 NOTE — PLAN OF CARE
Problem: Patient Care Overview  Goal: Plan of Care Review  Outcome: Ongoing (interventions implemented as appropriate)  Flowsheets  Taken 4/8/2020 2000  Plan of Care Reviewed With: patient  Taken 4/9/2020 0252  Outcome Summary: Pt is currently on room air.  NS @ 50mL/hr.  Will continue to monitor     Problem: Patient Care Overview  Goal: Discharge Needs Assessment  Outcome: Ongoing (interventions implemented as appropriate)  Flowsheets  Taken 4/9/2020 0252 by Latonya Gibson RN  Equipment Needed After Discharge: none  Taken 4/8/2020 1557 by Anne Anthony RN  Equipment Currently Used at Home: glucometer  Anticipated Changes Related to Illness: none  Transportation Anticipated: family or friend will provide  Transportation Concerns: car, none  Concerns to be Addressed: denies needs/concerns at this time  Readmission Within the Last 30 Days: no previous admission in last 30 days  Patient/Family Anticipated Services at Transition: none  Patient/Family Anticipates Transition to: home with family     Problem: Arrhythmia/Dysrhythmia (Symptomatic) (Adult)  Goal: Signs and Symptoms of Listed Potential Problems Will be Absent, Minimized or Managed (Arrhythmia/Dysrhythmia)  Outcome: Ongoing (interventions implemented as appropriate)  Flowsheets  Taken 4/8/2020 0241  Problems Assessed (Arrhythmia/Dysrhythmia): all  Taken 4/9/2020 0252  Problems Present (Dysrhythmia): none  Note:   Pt is not in normal sinus rhythm     Problem: Breathing Pattern Ineffective (Adult)  Goal: Identify Related Risk Factors and Signs and Symptoms  Outcome: Ongoing (interventions implemented as appropriate)  Flowsheets (Taken 4/8/2020 0241)  Related Risk Factors (Breathing Pattern Ineffective): fatigue;infection  Signs and Symptoms (Breathing Pattern Ineffective): breath sounds abnormal     Problem: Breathing Pattern Ineffective (Adult)  Goal: Effective Oxygenation/Ventilation  Outcome: Ongoing (interventions implemented as  appropriate)  Flowsheets (Taken 4/8/2020 0241)  Effective Oxygenation/Ventilation: making progress toward outcome     Problem: Breathing Pattern Ineffective (Adult)  Goal: Anxiety/Fear Reduction  Outcome: Ongoing (interventions implemented as appropriate)  Flowsheets (Taken 4/8/2020 0241)  Anxiety/Fear Reduction: making progress toward outcome

## 2020-04-09 NOTE — PROGRESS NOTES
Continued Stay Note   [facility]     Patient Name: Tone Weber  MRN: [id number]  Today's Date: 4/9/2020    Admit Date: 4/7/2020    Discharge Plan     Row Name 04/09/20 1243       Plan    Plan  Anticipate routine home    Plan Comments  DC barriers: covid positive               Maria Victoria Hidalgo RN

## 2020-04-09 NOTE — PROGRESS NOTES
[facility] Medicine Services Daily Progress Note      Hospitalist Team  LOS 2 days      Patient Care Team:  Ramses Auguste MD as PCP - General (Family Medicine)    Patient Location: 4128/1      Subjective   Subjective     Chief Complaint / Subjective  Chief Complaint   Patient presents with   • increased SOA Positive COVID test from [facility]         Brief Synopsis of Hospital Course/HPI  Patient is a 69-year-old diabetic with hypertension and hyperlipidemia who has been cultured positive since 3/25/2020.  The patient developed worsening dyspnea and was found to be relatively hypoxemic and was readmitted to the hospital with pneumonia.  The patient was placed on hydroxychloroquine per protocol.    Date:  4/8/2020  Overall the patient feels much better today.  He has not gotten up and moved around very much but when he does move around he feels much more short of breath.  Continues to have a dry nonproductive cough.    4/9/2020  Overall the patient reports that he is much less short of breath today.  In his room he had his oxygen off and still maintain his saturation at 95%.    Review of Systems   Constitution: Positive for decreased appetite and malaise/fatigue.   HENT: Negative.    Eyes: Negative.    Cardiovascular: Positive for dyspnea on exertion.   Respiratory: Positive for cough and shortness of breath.         Overall improved over yesterday.   Endocrine: Negative.    Hematologic/Lymphatic: Negative.    Skin: Negative.    Musculoskeletal: Negative.    Gastrointestinal: Negative.    Genitourinary: Negative.    Neurological: Negative.    Psychiatric/Behavioral: Negative.    Allergic/Immunologic: Negative.    All other systems reviewed and are negative.    Objective   Objective      Vital Signs  Temp:  [98.6 °F (37 °C)-99.3 °F (37.4 °C)] 98.6 °F (37 °C)  Heart Rate:  [72-94] 82  Resp:  [16-20] 16  BP: (112-145)/(49-67) 145/67  Oxygen Therapy  SpO2: 95 %  Pulse Oximetry Type:  "Continuous  Device (Oxygen Therapy): nasal cannula  Device (Oxygen Therapy): room air  Flow (L/min): 2  Flowsheet Rows      First Filed Value   Admission Height  177.8 cm (70\") Documented at 04/07/2020 1119   Admission Weight  95 kg (209 lb 7 oz) Documented at 04/07/2020 1119        Intake & Output (last 3 days)       04/06 0701 - 04/07 0700 04/07 0701 - 04/08 0700 04/08 0701 - 04/09 0700 04/09 0701 - 04/10 0700    P.O.  1185 1485 600    I.V. (mL/kg)  980 (10.3) 1821 (19.2)     Total Intake(mL/kg)  2165 (22.8) 3306 (34.8) 600 (6.3)    Urine (mL/kg/hr)  2250 3650 (1.6) 500 (0.5)    Total Output  2250 3650 500    Net  -85 -344 +100            Urine Unmeasured Occurrence  1 x          Lines, Drains & Airways    Active LDAs     Name:   Placement date:   Placement time:   Site:   Days:    Peripheral IV 04/07/20 1223 Right Antecubital   04/07/20    1223    Antecubital   1            Physical Exam:    Physical Exam   Constitutional: He is oriented to person, place, and time. He appears well-developed and well-nourished. No distress.   HENT:   Head: Normocephalic and atraumatic.   Right Ear: External ear normal.   Left Ear: External ear normal.   Nose: Nose normal.   Mouth/Throat: Oropharynx is clear and moist. No oropharyngeal exudate.   Eyes: Pupils are equal, round, and reactive to light. Conjunctivae and EOM are normal. Right eye exhibits no discharge. Left eye exhibits no discharge. No scleral icterus.   Neck: Normal range of motion. No JVD present. No tracheal deviation present. No thyromegaly present.   Cardiovascular: Normal rate, regular rhythm, normal heart sounds and intact distal pulses. Exam reveals no gallop and no friction rub.   No murmur heard.  Pulmonary/Chest: Effort normal. No stridor. No respiratory distress. He has no wheezes. He has no rales. He exhibits no tenderness.   Few rhonchi on auscultation.   Abdominal: Soft. Bowel sounds are normal. He exhibits no distension and no mass. There is no " tenderness. There is no rebound and no guarding. No hernia.   Musculoskeletal: Normal range of motion. He exhibits no edema, tenderness or deformity.   Lymphadenopathy:     He has no cervical adenopathy.   Neurological: He is alert and oriented to person, place, and time. No cranial nerve deficit or sensory deficit. He exhibits normal muscle tone. Coordination normal.   Skin: Skin is warm and dry. No rash noted. He is not diaphoretic. No erythema.   Psychiatric: He has a normal mood and affect. His behavior is normal.   Nursing note and vitals reviewed.        Procedures:    Results Review:     I reviewed the patient's new clinical results.    Lab Results (last 24 hours)     Procedure Component Value Units Date/Time    POC Glucose Once [[id number]]  (Abnormal) Collected:  04/09/20 1733    Specimen:  Blood Updated:  04/09/20 1740     Glucose 147 mg/dL      Comment: Serial Number: [id number]:  [id number]       POC Glucose Once [[id number]]  (Abnormal) Collected:  04/09/20 1208    Specimen:  Blood Updated:  04/09/20 1234     Glucose 134 mg/dL      Comment: Serial Number: [id number]:  [id number]       Blood Culture - Blood, Arm, Left [[id number]] Collected:  04/07/20 1147    Specimen:  Blood from Arm, Left Updated:  04/09/20 1230     Blood Culture No growth at 2 days    Blood Culture - Blood, Arm, Right [[id number]] Collected:  04/07/20 1147    Specimen:  Blood from Arm, Right Updated:  04/09/20 1230     Blood Culture No growth at 2 days    BNP [[id number]]  (Normal) Collected:  04/09/20 0905    Specimen:  Blood Updated:  04/09/20 0937     proBNP 190.2 pg/mL     Narrative:       Among patients with dyspnea, NT-proBNP is highly sensitive for the detection of acute congestive heart failure. In addition NT-proBNP of <300 pg/ml effectively rules out acute congestive heart failure with 99% negative predictive value.    Results may be falsely decreased if patient taking Biotin.      POC Glucose Once [[id number]]   (Abnormal) Collected:  04/09/20 0831    Specimen:  Blood Updated:  04/09/20 0832     Glucose 135 mg/dL      Comment: Serial Number: [id number]:  [id number]       Basic Metabolic Panel [[id number]]  (Abnormal) Collected:  04/09/20 0302    Specimen:  Blood Updated:  04/09/20 0403     Glucose 192 mg/dL      BUN 11 mg/dL      Creatinine 1.15 mg/dL      Sodium 133 mmol/L      Potassium 4.6 mmol/L      Chloride 100 mmol/L      CO2 21.0 mmol/L      Calcium 8.2 mg/dL      eGFR Non African Amer 63 mL/min/1.73      BUN/Creatinine Ratio 9.6     Anion Gap 12.0 mmol/L     Narrative:       GFR Normal >60  Chronic Kidney Disease <60  Kidney Failure <15      CBC Auto Differential [[id number]]  (Abnormal) Collected:  04/09/20 0302    Specimen:  Blood Updated:  04/09/20 0344     WBC 6.80 10*3/mm3      RBC 3.44 10*6/mm3      Hemoglobin 10.7 g/dL      Hematocrit 31.6 %      MCV 91.8 fL      MCH 31.2 pg      MCHC 33.9 g/dL      RDW 13.7 %      RDW-SD 44.6 fl      MPV 8.2 fL      Platelets 296 10*3/mm3      Neutrophil % 68.4 %      Lymphocyte % 18.8 %      Monocyte % 11.6 %      Eosinophil % 0.8 %      Basophil % 0.4 %      Neutrophils, Absolute 4.60 10*3/mm3      Lymphocytes, Absolute 1.30 10*3/mm3      Monocytes, Absolute 0.80 10*3/mm3      Eosinophils, Absolute 0.10 10*3/mm3      Basophils, Absolute 0.00 10*3/mm3      nRBC 0.1 /100 WBC     POC Glucose Once [[id number]]  (Abnormal) Collected:  04/08/20 2115    Specimen:  Blood Updated:  04/08/20 2116     Glucose 178 mg/dL      Comment: Serial Number: [id number]:  [id number]           Hemoglobin A1C   Date Value Ref Range Status   04/07/2020 7.3 (H) 3.5 - 5.6 % Final           Results from last 7 days   Lab Units 04/07/20  1211   PH, ARTERIAL pH units 7.462*   PO2 ART mm Hg 70.1*   PCO2, ARTERIAL mm Hg 27.6*   HCO3 ART mmol/L 19.7*     No results found for: LIPASE  No results found for: CHOL, CHLPL, TRIG, HDL, LDL, LDLDIRECT    No results found for: INTRAOP, PREDX, FINALDX,  COMDX    Microbiology Results (last 10 days)     Procedure Component Value - Date/Time    Blood Culture - Blood, Arm, Left [[id number]] Collected:  04/07/20 1147    Lab Status:  Preliminary result Specimen:  Blood from Arm, Left Updated:  04/09/20 1230     Blood Culture No growth at 2 days    Blood Culture - Blood, Arm, Right [[id number]] Collected:  04/07/20 1147    Lab Status:  Preliminary result Specimen:  Blood from Arm, Right Updated:  04/09/20 1230     Blood Culture No growth at 2 days        ECG/EMG Results (most recent)     Procedure Component Value Units Date/Time    ECG 12 Lead [[id number]] Collected:  04/07/20 1153     Updated:  04/07/20 1155    Narrative:       HEART RATE= 97  bpm  RR Interval= 617  ms  AZ Interval=   ms  P Horizontal Axis=   deg  P Front Axis=   deg  QRSD Interval= 81  ms  QT Interval= 343  ms  QRS Axis= -51  deg  T Wave Axis= 70  deg  - ABNORMAL ECG -  Atrial fibrillation  LAD, consider left anterior fascicular block  Consider anterior infarct  No previous ECG available for comparison  Electronically Signed By:   Date and Time of Study: 2020-04-07 11:53:51    ECG 12 Lead [[id number]] Collected:  04/08/20 0724     Updated:  04/08/20 0730    Narrative:       HEART RATE= 73  bpm  RR Interval= 820  ms  AZ Interval= 166  ms  P Horizontal Axis= -53  deg  P Front Axis= 79  deg  QRSD Interval= 88  ms  QT Interval= 399  ms  QRS Axis= -35  deg  T Wave Axis= 36  deg  - ABNORMAL ECG -  Sinus rhythm  Probable left atrial enlargement  Left axis deviation  Consider anteroseptal infarct  Electronically Signed By:   Date and Time of Study: 2020-04-08 07:24:38    ECG 12 Lead [[id number]] Collected:  04/09/20 0613     Updated:  04/09/20 0615    Narrative:       HEART RATE= 74  bpm  RR Interval= 808  ms  AZ Interval= 164  ms  P Horizontal Axis= -5  deg  P Front Axis= 63  deg  QRSD Interval= 89  ms  QT Interval= 397  ms  QRS Axis= -26  deg  T Wave Axis= 33  deg  - NORMAL ECG -  Sinus rhythm  Electronically  Signed By:   Date and Time of Study: 2020-04-09 06:13:32              Xr Chest 1 View    Result Date: 4/9/2020  Worsening progression is suggested radiographically with increased or new bilateral infiltrates. The findings may represent pulmonary edema. Infectious multifocal pneumonia cannot be excluded.  Electronically Signed By-Dr. Ramses Boyle MD On:4/9/2020 5:43 AM This report was finalized on [id number] by Dr. Ramses Boyle MD.    Xr Chest 1 View    Result Date: 4/7/2020  No prior for comparison. Patchy linear opacity lung bases could be seen with atelectasis or developing pneumonia. Chronic changes are also not excluded.  Electronically Signed By-Marcella Downs MD On:4/7/2020 12:39 PM This report was finalized on [id number] by  Marcella Downs MD.    Xrays, labs reviewed personally by physician.    Medication Review:   I have reviewed the patient's current medication list  Scheduled Meds    albuterol sulfate HFA 2 puff Inhalation 4x Daily - RT   atorvastatin 20 mg Oral Daily   hydroxychloroquine 200 mg Oral Q12H   insulin lispro 0-7 Units Subcutaneous 4x Daily With Meals & Nightly   lisinopril 10 mg Oral Daily   primidone 50 mg Oral Nightly   sodium chloride 10 mL Intravenous Q12H     Meds Infusions    Pharmacy Consult      Meds PRN  •  acetaminophen **OR** acetaminophen **OR** acetaminophen  •  aluminum-magnesium hydroxide-simethicone  •  bisacodyl  •  bisacodyl  •  dextrose  •  dextrose  •  glucagon (human recombinant)  •  HYDROcodone-acetaminophen  •  HYDROcodone-acetaminophen  •  insulin lispro **AND** insulin lispro  •  magnesium hydroxide  •  melatonin  •  ondansetron **OR** ondansetron  •  Pharmacy Consult  •  [COMPLETED] Insert peripheral IV **AND** sodium chloride  •  sodium chloride    Assessment/Plan   Assessment/Plan     Active Hospital Problems    Diagnosis  POA   • **Acute respiratory failure with hypoxia (CMS/HCC) [J96.01]  Yes   • COVID-19 virus detected [U07.1]  Yes   • Pneumonia due to  COVID-19 virus [U07.1, J12.89]  Yes   • Hyperlipidemia [E78.5]  Yes   • 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   MEDICAL DECISION MAKING COMPLEXITY BY PROBLEM:   1.  COVID 19 pneumonia  -Manifested by hypoxemia corrects with 2 L/min per nasal cannula of oxygen  -Continue antipyretics and inhalers  -Continue hydroxychloroquine as per protocol  -Discontinue azithromycin  -Repeat chest x-ray this a.m. looked worse but may be due to volume status.  -We will repeat chest x-ray in the morning again  -Bumex 1 mg IV now  -Repeat electrolytes and BNP in the a.m.    2.  New onset atrial fibrillation  -His rate is well controlled  -Patient is back in sinus rhythm at this time    3.  Hyponatremia  -Patient is now slightly hyponatremic with a sodium of 129 today  -Continue to follow    4.  Hypertension  -Continue home medications    5.  Hyperlipidemia  -Continue home statin    6.  Type 2 diabetes mellitus  -Sliding scale insulin with Accu-Cheks q. before meals and at bedtime  -Continue to hold metformin while in the hospital    7.  Essential tremor  -Continue primidone    VTE Prophylaxis -   Mechanical Order History:      Ordered        04/07/20 1330  Place Sequential Compression Device  Once         04/07/20 1330  Maintain Sequential Compression Device  Continuous                 Pharmalogical Order History:     None      Code Status -   Code Status and Medical Interventions:   Ordered at: 04/07/20 1330     Level Of Support Discussed With:    Patient     Code Status:    CPR     Medical Interventions (Level of Support Prior to Arrest):    Full     Discharge Planning    Destination      Coordination has not been started for this encounter.      Durable Medical  Equipment      Coordination has not been started for this encounter.      Dialysis/Infusion      Coordination has not been started for this encounter.      Home Medical Care      Coordination has not been started for this encounter.      Therapy      Coordination has not been started for this encounter.      Community Resources      Coordination has not been started for this encounter.      Electronically signed by Shelbi White MD, 04/09/20, 18:17.  [facility], [place] Hospitalist Team

## 2020-04-09 NOTE — PLAN OF CARE
Problem: Patient Care Overview  Goal: Plan of Care Review  Outcome: Ongoing (interventions implemented as appropriate)  Flowsheets (Taken 4/9/2020 1228)  Progress: improving  Plan of Care Reviewed With: patient  Outcome Summary: Pt states feeling much better/ Pt sitting in chair at this time / Remains on room air sats 94-97%  Goal: Individualization and Mutuality  Outcome: Ongoing (interventions implemented as appropriate)  Goal: Discharge Needs Assessment  Outcome: Ongoing (interventions implemented as appropriate)  Goal: Interprofessional Rounds/Family Conf  Outcome: Ongoing (interventions implemented as appropriate)     Problem: Arrhythmia/Dysrhythmia (Symptomatic) (Adult)  Goal: Signs and Symptoms of Listed Potential Problems Will be Absent, Minimized or Managed (Arrhythmia/Dysrhythmia)  Outcome: Ongoing (interventions implemented as appropriate)     Problem: Breathing Pattern Ineffective (Adult)  Goal: Identify Related Risk Factors and Signs and Symptoms  Outcome: Ongoing (interventions implemented as appropriate)  Goal: Effective Oxygenation/Ventilation  Outcome: Ongoing (interventions implemented as appropriate)  Goal: Anxiety/Fear Reduction  Outcome: Ongoing (interventions implemented as appropriate)

## 2020-04-10 ENCOUNTER — APPOINTMENT (OUTPATIENT)
Dept: GENERAL RADIOLOGY | Facility: HOSPITAL | Age: 70
End: 2020-04-10

## 2020-04-10 LAB
ANION GAP SERPL CALCULATED.3IONS-SCNC: 14 MMOL/L (ref 5–15)
BASOPHILS # BLD AUTO: 0 10*3/MM3 (ref 0–0.2)
BASOPHILS NFR BLD AUTO: 0.4 % (ref 0–1.5)
BUN BLD-MCNC: 15 MG/DL (ref 8–23)
BUN/CREAT SERPL: 15.3 (ref 7–25)
CALCIUM SPEC-SCNC: 8.7 MG/DL (ref 8.6–10.5)
CHLORIDE SERPL-SCNC: 98 MMOL/L (ref 98–107)
CO2 SERPL-SCNC: 22 MMOL/L (ref 22–29)
CREAT BLD-MCNC: 0.98 MG/DL (ref 0.76–1.27)
DEPRECATED RDW RBC AUTO: 42.9 FL (ref 37–54)
EOSINOPHIL # BLD AUTO: 0.1 10*3/MM3 (ref 0–0.4)
EOSINOPHIL NFR BLD AUTO: 1.3 % (ref 0.3–6.2)
ERYTHROCYTE [DISTWIDTH] IN BLOOD BY AUTOMATED COUNT: 13.4 % (ref 12.3–15.4)
GFR SERPL CREATININE-BSD FRML MDRD: 76 ML/MIN/1.73
GLUCOSE BLD-MCNC: 171 MG/DL (ref 65–99)
GLUCOSE BLDC GLUCOMTR-MCNC: 145 MG/DL (ref 70–105)
GLUCOSE BLDC GLUCOMTR-MCNC: 149 MG/DL (ref 70–105)
GLUCOSE BLDC GLUCOMTR-MCNC: 207 MG/DL (ref 70–105)
GLUCOSE BLDC GLUCOMTR-MCNC: 209 MG/DL (ref 70–105)
HCT VFR BLD AUTO: 31.7 % (ref 37.5–51)
HGB BLD-MCNC: 11 G/DL (ref 13–17.7)
LYMPHOCYTES # BLD AUTO: 1.6 10*3/MM3 (ref 0.7–3.1)
LYMPHOCYTES NFR BLD AUTO: 22.5 % (ref 19.6–45.3)
MCH RBC QN AUTO: 31.5 PG (ref 26.6–33)
MCHC RBC AUTO-ENTMCNC: 34.7 G/DL (ref 31.5–35.7)
MCV RBC AUTO: 90.8 FL (ref 79–97)
MONOCYTES # BLD AUTO: 1 10*3/MM3 (ref 0.1–0.9)
MONOCYTES NFR BLD AUTO: 13.5 % (ref 5–12)
NEUTROPHILS # BLD AUTO: 4.4 10*3/MM3 (ref 1.7–7)
NEUTROPHILS NFR BLD AUTO: 62.3 % (ref 42.7–76)
NRBC BLD AUTO-RTO: 0 /100 WBC (ref 0–0.2)
PLATELET # BLD AUTO: 344 10*3/MM3 (ref 140–450)
PMV BLD AUTO: 8.6 FL (ref 6–12)
POTASSIUM BLD-SCNC: 4.4 MMOL/L (ref 3.5–5.2)
RBC # BLD AUTO: 3.49 10*6/MM3 (ref 4.14–5.8)
SODIUM BLD-SCNC: 134 MMOL/L (ref 136–145)
WBC NRBC COR # BLD: 7.1 10*3/MM3 (ref 3.4–10.8)

## 2020-04-10 PROCEDURE — 94799 UNLISTED PULMONARY SVC/PX: CPT

## 2020-04-10 PROCEDURE — 85025 COMPLETE CBC W/AUTO DIFF WBC: CPT | Performed by: INTERNAL MEDICINE

## 2020-04-10 PROCEDURE — 82962 GLUCOSE BLOOD TEST: CPT

## 2020-04-10 PROCEDURE — 63710000001 INSULIN LISPRO (HUMAN) PER 5 UNITS: Performed by: NURSE PRACTITIONER

## 2020-04-10 PROCEDURE — 99233 SBSQ HOSP IP/OBS HIGH 50: CPT | Performed by: INTERNAL MEDICINE

## 2020-04-10 PROCEDURE — 94618 PULMONARY STRESS TESTING: CPT

## 2020-04-10 PROCEDURE — 71045 X-RAY EXAM CHEST 1 VIEW: CPT

## 2020-04-10 PROCEDURE — 80048 BASIC METABOLIC PNL TOTAL CA: CPT | Performed by: INTERNAL MEDICINE

## 2020-04-10 RX ADMIN — ALBUTEROL SULFATE 2 PUFF: 90 AEROSOL, METERED RESPIRATORY (INHALATION) at 06:33

## 2020-04-10 RX ADMIN — Medication 10 ML: at 08:09

## 2020-04-10 RX ADMIN — ALBUTEROL SULFATE 2 PUFF: 90 AEROSOL, METERED RESPIRATORY (INHALATION) at 10:57

## 2020-04-10 RX ADMIN — INSULIN LISPRO 3 UNITS: 100 INJECTION, SOLUTION INTRAVENOUS; SUBCUTANEOUS at 17:54

## 2020-04-10 RX ADMIN — ALBUTEROL SULFATE 2 PUFF: 90 AEROSOL, METERED RESPIRATORY (INHALATION) at 19:26

## 2020-04-10 RX ADMIN — HYDROXYCHLOROQUINE SULFATE 200 MG: 200 TABLET, FILM COATED ORAL at 08:09

## 2020-04-10 RX ADMIN — ALBUTEROL SULFATE 2 PUFF: 90 AEROSOL, METERED RESPIRATORY (INHALATION) at 15:06

## 2020-04-10 RX ADMIN — ATORVASTATIN CALCIUM 20 MG: 20 TABLET, FILM COATED ORAL at 08:09

## 2020-04-10 RX ADMIN — HYDROXYCHLOROQUINE SULFATE 200 MG: 200 TABLET, FILM COATED ORAL at 20:25

## 2020-04-10 RX ADMIN — LISINOPRIL 10 MG: 5 TABLET ORAL at 08:09

## 2020-04-10 RX ADMIN — INSULIN LISPRO 3 UNITS: 100 INJECTION, SOLUTION INTRAVENOUS; SUBCUTANEOUS at 20:51

## 2020-04-10 RX ADMIN — PRIMIDONE 50 MG: 50 TABLET ORAL at 20:26

## 2020-04-10 RX ADMIN — Medication 10 ML: at 20:30

## 2020-04-10 NOTE — PLAN OF CARE
Pt O2 sats remaining in the high 90s on room air. Pt states he is having a productive cough at times but not short of breath.

## 2020-04-10 NOTE — PROGRESS NOTES
[facility] Daily Progress Note      Hospitalist Team  LOS 3 days      Patient Care Team:  Ramses Auguste MD as PCP - General (Family Medicine)    Patient Location: 4128/1      Subjective   Subjective     Chief Complaint / Subjective  Chief Complaint   Patient presents with   • increased SOA Positive COVID test from Health Dept         Brief Synopsis of Hospital Course/HPI  Patient is a 69-year-old diabetic with hypertension and hyperlipidemia who has been cultured positive since 3/25/2020.  The patient developed worsening dyspnea and was found to be relatively hypoxemic and was readmitted to the hospital with pneumonia.  The patient was placed on hydroxychloroquine per protocol.    Date:  4/8/2020  Overall the patient feels much better today.  He has not gotten up and moved around very much but when he does move around he feels much more short of breath.  Continues to have a dry nonproductive cough.    4/9/2020  Overall the patient reports that he is much less short of breath today.  In his room he had his oxygen off and still maintain his saturation at 95%.    Review of Systems   Constitution: Positive for decreased appetite and malaise/fatigue.   HENT: Negative.    Eyes: Negative.    Cardiovascular: Positive for dyspnea on exertion.   Respiratory: Positive for cough and shortness of breath.         Overall improved over yesterday.   Endocrine: Negative.    Hematologic/Lymphatic: Negative.    Skin: Negative.    Musculoskeletal: Negative.    Gastrointestinal: Negative.    Genitourinary: Negative.    Neurological: Negative.    Psychiatric/Behavioral: Negative.    Allergic/Immunologic: Negative.    All other systems reviewed and are negative.    Objective   Objective      Vital Signs  Temp:  [97.3 °F (36.3 °C)-99.4 °F (37.4 °C)] 97.6 °F (36.4 °C)  Heart Rate:  [70-89] 85  Resp:  [14-25] 25  BP: (143-157)/(68-82) 154/75  Oxygen Therapy  SpO2: 96 %  Pulse Oximetry Type:  "Continuous  Device (Oxygen Therapy): nasal cannula  Device (Oxygen Therapy): room air  Flow (L/min): 1  Flowsheet Rows      First Filed Value   Admission Height  177.8 cm (70\") Documented at 04/07/2020 1119   Admission Weight  95 kg (209 lb 7 oz) Documented at 04/07/2020 1119        Intake & Output (last 3 days)       04/07 0701 - 04/08 0700 04/08 0701 - 04/09 0700 04/09 0701 - 04/10 0700 04/10 0701 - 04/11 0700    P.O. 1185 1485 600 960    I.V. (mL/kg) 980 (10.3) 1821 (19.2)      Total Intake(mL/kg) 2165 (22.8) 3306 (34.8) 600 (6.3) 960 (10.1)    Urine (mL/kg/hr) 2250 3650 (1.6) 2900 (1.3) 34315.6 (49.9)    Total Output 2250 3650 2900 11699.6    Net -85 -344 -2300 -23442.6            Urine Unmeasured Occurrence 1 x           Lines, Drains & Airways    Active LDAs     Name:   Placement date:   Placement time:   Site:   Days:    Peripheral IV 04/07/20 1223 Right Antecubital   04/07/20    1223    Antecubital   1            Physical Exam:    Physical Exam   Constitutional: He is oriented to person, place, and time. He appears well-developed and well-nourished. No distress.   HENT:   Head: Normocephalic and atraumatic.   Right Ear: External ear normal.   Left Ear: External ear normal.   Nose: Nose normal.   Mouth/Throat: Oropharynx is clear and moist. No oropharyngeal exudate.   Eyes: Pupils are equal, round, and reactive to light. Conjunctivae and EOM are normal. Right eye exhibits no discharge. Left eye exhibits no discharge. No scleral icterus.   Neck: Normal range of motion. No JVD present. No tracheal deviation present. No thyromegaly present.   Cardiovascular: Normal rate, regular rhythm, normal heart sounds and intact distal pulses. Exam reveals no gallop and no friction rub.   No murmur heard.  Pulmonary/Chest: Effort normal. No stridor. No respiratory distress. He has no wheezes. He has no rales. He exhibits no tenderness.   The patient's chest exam continues to improve daily.     Abdominal: Soft. Bowel sounds " are normal. He exhibits no distension and no mass. There is no tenderness. There is no rebound and no guarding. No hernia.   Musculoskeletal: Normal range of motion. He exhibits no edema, tenderness or deformity.   Lymphadenopathy:     He has no cervical adenopathy.   Neurological: He is alert and oriented to person, place, and time. No cranial nerve deficit or sensory deficit. He exhibits normal muscle tone. Coordination normal.   Skin: Skin is warm and dry. No rash noted. He is not diaphoretic. No erythema.   Psychiatric: He has a normal mood and affect. His behavior is normal.   Nursing note and vitals reviewed.        Procedures:  Rest and exercise oximetry were done today which demonstrated no desaturations below 90 on a 6-minute walk test.    Results Review:     I reviewed the patient's new clinical results.    Lab Results (last 24 hours)     Procedure Component Value Units Date/Time    Blood Culture - Blood, Arm, Left [[id number]] Collected:  04/07/20 1147    Specimen:  Blood from Arm, Left Updated:  04/10/20 1231     Blood Culture No growth at 3 days    Blood Culture - Blood, Arm, Right [[id number]] Collected:  04/07/20 1147    Specimen:  Blood from Arm, Right Updated:  04/10/20 1231     Blood Culture No growth at 3 days    POC Glucose Once [[id number]]  (Abnormal) Collected:  04/10/20 1203    Specimen:  Blood Updated:  04/10/20 1204     Glucose 149 mg/dL      Comment: Serial Number: [id number]:  [id number]       POC Glucose Once [[id number]]  (Abnormal) Collected:  04/10/20 0807    Specimen:  Blood Updated:  04/10/20 0813     Glucose 145 mg/dL      Comment: Serial Number: [id number]:  [id number]       Basic Metabolic Panel [[id number]]  (Abnormal) Collected:  04/10/20 0352    Specimen:  Blood Updated:  04/10/20 0539     Glucose 171 mg/dL      BUN 15 mg/dL      Creatinine 0.98 mg/dL      Sodium 134 mmol/L      Potassium 4.4 mmol/L      Chloride 98 mmol/L      CO2 22.0 mmol/L      Calcium 8.7 mg/dL       eGFR Non African Amer 76 mL/min/1.73      BUN/Creatinine Ratio 15.3     Anion Gap 14.0 mmol/L     Narrative:       GFR Normal >60  Chronic Kidney Disease <60  Kidney Failure <15      CBC Auto Differential [[id number]]  (Abnormal) Collected:  04/10/20 0352    Specimen:  Blood Updated:  04/10/20 0510     WBC 7.10 10*3/mm3      RBC 3.49 10*6/mm3      Hemoglobin 11.0 g/dL      Hematocrit 31.7 %      MCV 90.8 fL      MCH 31.5 pg      MCHC 34.7 g/dL      RDW 13.4 %      RDW-SD 42.9 fl      MPV 8.6 fL      Platelets 344 10*3/mm3      Neutrophil % 62.3 %      Lymphocyte % 22.5 %      Monocyte % 13.5 %      Eosinophil % 1.3 %      Basophil % 0.4 %      Neutrophils, Absolute 4.40 10*3/mm3      Lymphocytes, Absolute 1.60 10*3/mm3      Monocytes, Absolute 1.00 10*3/mm3      Eosinophils, Absolute 0.10 10*3/mm3      Basophils, Absolute 0.00 10*3/mm3      nRBC 0.0 /100 WBC     POC Glucose Once [[id number]]  (Abnormal) Collected:  04/09/20 2025    Specimen:  Blood Updated:  04/09/20 2027     Glucose 154 mg/dL      Comment: Serial Number: [id number]:  [id number]       POC Glucose Once [[id number]]  (Abnormal) Collected:  04/09/20 1733    Specimen:  Blood Updated:  04/09/20 1740     Glucose 147 mg/dL      Comment: Serial Number: [id number]:  [id number]           No results found for: HGBA1C        Results from last 7 days   Lab Units 04/07/20  1211   PH, ARTERIAL pH units 7.462*   PO2 ART mm Hg 70.1*   PCO2, ARTERIAL mm Hg 27.6*   HCO3 ART mmol/L 19.7*     No results found for: LIPASE  No results found for: CHOL, CHLPL, TRIG, HDL, LDL, LDLDIRECT    No results found for: INTRAOP, PREDX, FINALDX, COMDX    Microbiology Results (last 10 days)     Procedure Component Value - Date/Time    Blood Culture - Blood, Arm, Left [[id number]] Collected:  04/07/20 1147    Lab Status:  Preliminary result Specimen:  Blood from Arm, Left Updated:  04/10/20 1231     Blood Culture No growth at 3 days    Blood Culture - Blood, Arm, Right  [[id number]] Collected:  04/07/20 1147    Lab Status:  Preliminary result Specimen:  Blood from Arm, Right Updated:  04/10/20 1231     Blood Culture No growth at 3 days        ECG/EMG Results (most recent)     Procedure Component Value Units Date/Time    ECG 12 Lead [[id number]] Collected:  04/08/20 0724     Updated:  04/08/20 0730    Narrative:       HEART RATE= 73  bpm  RR Interval= 820  ms  WV Interval= 166  ms  P Horizontal Axis= -53  deg  P Front Axis= 79  deg  QRSD Interval= 88  ms  QT Interval= 399  ms  QRS Axis= -35  deg  T Wave Axis= 36  deg  - ABNORMAL ECG -  Sinus rhythm  Probable left atrial enlargement  Left axis deviation  Consider anteroseptal infarct  Electronically Signed By:   Date and Time of Study: 2020-04-08 07:24:38    ECG 12 Lead [[id number]] Collected:  04/09/20 0613     Updated:  04/09/20 0615    Narrative:       HEART RATE= 74  bpm  RR Interval= 808  ms  WV Interval= 164  ms  P Horizontal Axis= -5  deg  P Front Axis= 63  deg  QRSD Interval= 89  ms  QT Interval= 397  ms  QRS Axis= -26  deg  T Wave Axis= 33  deg  - NORMAL ECG -  Sinus rhythm  Electronically Signed By:   Date and Time of Study: 2020-04-09 06:13:32    ECG 12 Lead [[id number]] Collected:  04/07/20 1153     Updated:  04/10/20 1402    Narrative:       HEART RATE= 97  bpm  RR Interval= 617  ms  WV Interval=   ms  P Horizontal Axis=   deg  P Front Axis=   deg  QRSD Interval= 81  ms  QT Interval= 343  ms  QRS Axis= -51  deg  T Wave Axis= 70  deg  - ABNORMAL ECG -  Atrial fibrillation  LAD, consider left anterior fascicular block  Consider anterior infarct  No previous ECG available for comparison  Electronically Signed By: Bishnu Rooney ([facility]) 10-Apr-2020 14:02:44  Date and Time of Study: 2020-04-07 11:53:51              Xr Chest 1 View    Result Date: 4/10/2020  Mild left basilar airspace disease is unchanged, which could reflect atelectasis or pneumonia.  Electronically Signed By-DR. Alfonzo Tyler MD On:4/10/2020 3:05 PM This report  was finalized on [id number] by DR. Alfonzo Tyler MD.    Xr Chest 1 View    Result Date: 4/9/2020  1. Mild improvement of the left-sided airspace disease with stable appearance of the right lung.  Electronically Signed By-Parish Barbour On:4/9/2020 10:53 PM This report was finalized on [id number] by  Parish Barbour, .    Xr Chest 1 View    Result Date: 4/9/2020  Worsening progression is suggested radiographically with increased or new bilateral infiltrates. The findings may represent pulmonary edema. Infectious multifocal pneumonia cannot be excluded.  Electronically Signed By-Dr. Ramses Boyle MD On:4/9/2020 5:43 AM This report was finalized on [id number] by Dr. Ramses Boyle MD.    Xr Chest 1 View    Result Date: 4/7/2020  No prior for comparison. Patchy linear opacity lung bases could be seen with atelectasis or developing pneumonia. Chronic changes are also not excluded.  Electronically Signed By-Marcella Downs MD On:4/7/2020 12:39 PM This report was finalized on [id number] by  Marcella Downs MD.    Xrays, labs reviewed personally by physician.    Medication Review:   I have reviewed the patient's current medication list  Scheduled Meds    albuterol sulfate HFA 2 puff Inhalation 4x Daily - RT   atorvastatin 20 mg Oral Daily   hydroxychloroquine 200 mg Oral Q12H   insulin lispro 0-7 Units Subcutaneous 4x Daily With Meals & Nightly   lisinopril 10 mg Oral Daily   primidone 50 mg Oral Nightly   sodium chloride 10 mL Intravenous Q12H     Meds Infusions    Pharmacy Consult      Meds PRN  •  acetaminophen **OR** acetaminophen **OR** acetaminophen  •  aluminum-magnesium hydroxide-simethicone  •  bisacodyl  •  bisacodyl  •  dextrose  •  dextrose  •  glucagon (human recombinant)  •  HYDROcodone-acetaminophen  •  HYDROcodone-acetaminophen  •  insulin lispro **AND** insulin lispro  •  magnesium hydroxide  •  melatonin  •  ondansetron **OR** ondansetron  •  Pharmacy Consult  •  [COMPLETED] Insert peripheral  "IV **AND** sodium chloride  •  sodium chloride    Assessment/Plan   Assessment/Plan     Active Hospital Problems    Diagnosis  POA   • **Acute respiratory failure with hypoxia (CMS/HCC) [J96.01]  Yes   • COVID-19 virus detected [U07.1]  Yes   • Pneumonia due to COVID-19 virus [U07.1, J12.89]  Yes   • Hyperlipidemia [E78.5]  Yes   • 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   MEDICAL DECISION MAKING COMPLEXITY BY PROBLEM:   1.  COVID 19 pneumonia  -Manifested by hypoxemia corrects with 2 L/min per nasal cannula of oxygen  -Continue antipyretics and inhalers  -Continue hydroxychloroquine as per protocol  -Discontinue azithromycin  -Repeat chest x-ray this a.m. looked worse but may be due to volume status.  -We will repeat chest x-ray in the morning again  -Bumex 1 mg IV yesterday  -Repeat electrolytes and BNP in the a.m.    I had an extensive discussion with the patient's wife.  She is very concerned that the patient will decompensate at home and that he will be reluctant to come back to the hospital as he apparently has done that once before during this illness.  She is also concerned that she will not \"be able to monitor him\" because she does not have pulse oximetry and oxygen.  I discussed with the wife the fact that the patient did not qualify for home oxygen had been up for a 6-minute walk test without desaturating.  She basically refused to take the patient home today but was told that he will be discharged tomorrow morning if he has no issues the rest of today and tonight.  She also does not want him sent out on hydroxychloroquine.  I spent a large amount of time explaining to the patient that this is not a new drug and that it has been safely used for " decades for collagen vascular diseases and malaria.  I also discussed with her the fact that the patient will only need an additional days of therapy when he is discharged.  I also discussed with her the fact that the patient will need an outpatient follow-up.  The patient's wife did not know how he would get that because apparently his primary care provider is not doing video patient visits.  I have asked that she contact the physician that they regularly use and see what they are doing for other patients in their practice that have been affected by COVID.  He will also need a chest x-ray 1 week after discharge and will need to be followed with chest x-rays until this resolves.    2.  New onset atrial fibrillation  -His rate is well controlled  -Patient is back in sinus rhythm at this time    3.  Hyponatremia  -Patient is now slightly hyponatremic with a sodium of 129 today  -Continue to follow    4.  Hypertension  -Continue home medications    5.  Hyperlipidemia  -Continue home statin    6.  Type 2 diabetes mellitus  -Sliding scale insulin with Accu-Cheks q. before meals and at bedtime  -Continue to hold metformin while in the hospital    7.  Essential tremor  -Continue primidone    VTE Prophylaxis -   Mechanical Order History:      Ordered        04/07/20 1330  Place Sequential Compression Device  Once         04/07/20 1330  Maintain Sequential Compression Device  Continuous                 Pharmalogical Order History:     None      Code Status -   Code Status and Medical Interventions:   Ordered at: 04/07/20 1330     Level Of Support Discussed With:    Patient     Code Status:    CPR     Medical Interventions (Level of Support Prior to Arrest):    Full     Discharge Planning    Destination      Coordination has not been started for this encounter.      Durable Medical Equipment      Coordination has not been started for this encounter.      Dialysis/Infusion      Coordination has not been started for this  encounter.      Home Medical Care      Coordination has not been started for this encounter.      Therapy      Coordination has not been started for this encounter.      Community Resources      Coordination has not been started for this encounter.      Electronically signed by Shelbi White MD, 04/10/20, 15:32.  [facility] Hospitalist Team

## 2020-04-10 NOTE — CONSULTS
"Diabetes Education  Assessment/Teaching    Patient Name:  Tone Weber  YOB: 1950  MRN: [id number]  Admit Date:  4/7/2020      Assessment Date:  4/10/2020    Most Recent Value   General Information    Referral From:  A1c [On 4/7/2020 A1C result was 7.3%.]   Height  177.8 cm (70\")   Height Method  Stated   Weight  94.9 kg (209 lb 3.5 oz)   Weight Method  Bed scale   Pregnancy Assessment   Diabetes History   What type of diabetes do you have?  Type 2   Length of Diabetes Diagnosis  1 - 5 years   Current DM knowledge  fair   Do you test your blood sugar at home?  no [Patient states he has a One Touch glucometer at home that is about 2 years old, but doctor told him last Fall that he didn't need to check his blood sugar any more unless he wasn't feeling right.]   Have you had high blood sugar? (>140mg/dl)  yes   How often do you have high blood sugar?  unknown   When was your last high blood sugar?  Admission blood sugar 182.   How would you rate your diabetes control?  good   Education Preferences   What areas of diabetes would you like to learn about?  diabetes complications   Nutrition Information   Assessment Topics   Problem Solving - Assessment  Needs education   Reducing Risk - Assessment  Needs education   DM Goals   Problem Solving - Goal  Today   Reducing Risk - Goal  Today            Most Recent Value   DM Education Needs   Meter  Has own   Meter Type  One Touch   Medication  Oral [Patient states he takes Metformin 1,000 mg BID at home.]   Reducing Risks  A1C testing [On 4/7/2020 A1C result was 7.3%. Discussed A1C target and healthy blood sugar range.]   Healthy Eating  Other (comment) [Patient states he has been losing 2-3 pounds a year for a couple of years.  He states he only drinks water and unsweetened tea.]   Physical Activity  Other (comment) [Patient states he does a lot of yard work at home.]   Discharge Plan  Home   Motivation  Engaged, Strong   Teaching Method  Discussion   Patient " Response  Verbalized understanding            Other Comments: Telephone conversation per precautions during COVID 19.  Patient states that for the last 3 years his A1C has been right at 7%. His doctor told him since he had been so consistent on his results that he did not need to check his blood sugar any more unless he didn't feel right.            Electronically signed by:  Inga Shaikh RN  04/10/20 10:43

## 2020-04-10 NOTE — PLAN OF CARE
Problem: Patient Care Overview  Goal: Plan of Care Review  Outcome: Ongoing (interventions implemented as appropriate)  Flowsheets (Taken 4/10/2020 1402)  Progress: improving  Plan of Care Reviewed With: patient  Outcome Summary: Pt doing well no complaints plans on discharging in am  Goal: Individualization and Mutuality  Outcome: Ongoing (interventions implemented as appropriate)  Goal: Discharge Needs Assessment  Outcome: Ongoing (interventions implemented as appropriate)  Goal: Interprofessional Rounds/Family Conf  Outcome: Ongoing (interventions implemented as appropriate)     Problem: Arrhythmia/Dysrhythmia (Symptomatic) (Adult)  Goal: Signs and Symptoms of Listed Potential Problems Will be Absent, Minimized or Managed (Arrhythmia/Dysrhythmia)  Outcome: Ongoing (interventions implemented as appropriate)     Problem: Breathing Pattern Ineffective (Adult)  Goal: Identify Related Risk Factors and Signs and Symptoms  Outcome: Ongoing (interventions implemented as appropriate)  Goal: Effective Oxygenation/Ventilation  Outcome: Ongoing (interventions implemented as appropriate)  Goal: Anxiety/Fear Reduction  Outcome: Ongoing (interventions implemented as appropriate)

## 2020-04-10 NOTE — PROGRESS NOTES
Exercise Oximetry    Patient Name:Tone Weber   MRN: [id number]   Date: 04/10/20             ROOM AIR BASELINE   SpO2% 94%   Heart Rate 79   Blood Pressure      EXERCISE ON ROOM AIR SpO2% EXERCISE ON O2 @  LPM SpO2%   1 MINUTE  1 MINUTE    2 MINUTES 94 2 MINUTES    3 MINUTES  3 MINUTES    4 MINUTES 95 4 MINUTES    5 MINUTES  5 MINUTES    6 MINUTES 95 6 MINUTES               Distance Walked:  6 minutes around room Distance Walked   Dyspnea ([place] Scale)  slight Dyspnea ([place] Scale)   Fatigue ([place] Scale)   Fatigue ([place] Scale)   SpO2% Post Exercise  98 SpO2% Post Exercise   HR Post Exercise  91 HR Post Exercise   Time to Recovery  2 minutes Time to Recovery     Comments: Pt walked in room for 6 minutes. Pt did great with walk. He stated that he has a hard time walking and talking at the same time.    Carolyn Rosario

## 2020-04-10 NOTE — PROGRESS NOTES
Continued Stay Note  VALENTINE Moore     Patient Name: Tone Weber  MRN: [id number]  Today's Date: 4/10/2020    Admit Date: 4/7/2020    Discharge Plan     Row Name 04/10/20 1105       Plan    Plan  DC plan: home with family    Plan Comments  DC barriers: chest xray pending, covid 19 positive            Expected Discharge Date and Time     Expected Discharge Date Expected Discharge Time    Apr 10, 2020             Anne Anthony RN

## 2020-04-11 ENCOUNTER — APPOINTMENT (OUTPATIENT)
Dept: GENERAL RADIOLOGY | Facility: HOSPITAL | Age: 70
End: 2020-04-11

## 2020-04-11 LAB
ANION GAP SERPL CALCULATED.3IONS-SCNC: 14 MMOL/L (ref 5–15)
BASOPHILS # BLD AUTO: 0 10*3/MM3 (ref 0–0.2)
BASOPHILS NFR BLD AUTO: 0.3 % (ref 0–1.5)
BUN BLD-MCNC: 14 MG/DL (ref 8–23)
BUN/CREAT SERPL: 14.9 (ref 7–25)
CALCIUM SPEC-SCNC: 8.7 MG/DL (ref 8.6–10.5)
CHLORIDE SERPL-SCNC: 97 MMOL/L (ref 98–107)
CO2 SERPL-SCNC: 22 MMOL/L (ref 22–29)
CREAT BLD-MCNC: 0.94 MG/DL (ref 0.76–1.27)
DEPRECATED RDW RBC AUTO: 43.8 FL (ref 37–54)
EOSINOPHIL # BLD AUTO: 0.1 10*3/MM3 (ref 0–0.4)
EOSINOPHIL NFR BLD AUTO: 1.3 % (ref 0.3–6.2)
ERYTHROCYTE [DISTWIDTH] IN BLOOD BY AUTOMATED COUNT: 13.5 % (ref 12.3–15.4)
GFR SERPL CREATININE-BSD FRML MDRD: 79 ML/MIN/1.73
GLUCOSE BLD-MCNC: 166 MG/DL (ref 65–99)
GLUCOSE BLDC GLUCOMTR-MCNC: 142 MG/DL (ref 70–105)
GLUCOSE BLDC GLUCOMTR-MCNC: 161 MG/DL (ref 70–105)
GLUCOSE BLDC GLUCOMTR-MCNC: 171 MG/DL (ref 70–105)
GLUCOSE BLDC GLUCOMTR-MCNC: 174 MG/DL (ref 70–105)
HCT VFR BLD AUTO: 31.2 % (ref 37.5–51)
HGB BLD-MCNC: 10.9 G/DL (ref 13–17.7)
LYMPHOCYTES # BLD AUTO: 1.7 10*3/MM3 (ref 0.7–3.1)
LYMPHOCYTES NFR BLD AUTO: 20.9 % (ref 19.6–45.3)
MCH RBC QN AUTO: 32.1 PG (ref 26.6–33)
MCHC RBC AUTO-ENTMCNC: 34.9 G/DL (ref 31.5–35.7)
MCV RBC AUTO: 92.1 FL (ref 79–97)
MONOCYTES # BLD AUTO: 1.2 10*3/MM3 (ref 0.1–0.9)
MONOCYTES NFR BLD AUTO: 14.3 % (ref 5–12)
NEUTROPHILS # BLD AUTO: 5.1 10*3/MM3 (ref 1.7–7)
NEUTROPHILS NFR BLD AUTO: 63.2 % (ref 42.7–76)
NRBC BLD AUTO-RTO: 0 /100 WBC (ref 0–0.2)
PLATELET # BLD AUTO: 409 10*3/MM3 (ref 140–450)
PMV BLD AUTO: 8.3 FL (ref 6–12)
POTASSIUM BLD-SCNC: 4.5 MMOL/L (ref 3.5–5.2)
RBC # BLD AUTO: 3.39 10*6/MM3 (ref 4.14–5.8)
SODIUM BLD-SCNC: 133 MMOL/L (ref 136–145)
WBC NRBC COR # BLD: 8.1 10*3/MM3 (ref 3.4–10.8)

## 2020-04-11 PROCEDURE — 99233 SBSQ HOSP IP/OBS HIGH 50: CPT | Performed by: INTERNAL MEDICINE

## 2020-04-11 PROCEDURE — 82962 GLUCOSE BLOOD TEST: CPT

## 2020-04-11 PROCEDURE — 80048 BASIC METABOLIC PNL TOTAL CA: CPT | Performed by: INTERNAL MEDICINE

## 2020-04-11 PROCEDURE — 94799 UNLISTED PULMONARY SVC/PX: CPT

## 2020-04-11 PROCEDURE — 63710000001 INSULIN LISPRO (HUMAN) PER 5 UNITS: Performed by: NURSE PRACTITIONER

## 2020-04-11 PROCEDURE — 71045 X-RAY EXAM CHEST 1 VIEW: CPT

## 2020-04-11 PROCEDURE — 85025 COMPLETE CBC W/AUTO DIFF WBC: CPT | Performed by: INTERNAL MEDICINE

## 2020-04-11 RX ADMIN — HYDROXYCHLOROQUINE SULFATE 200 MG: 200 TABLET, FILM COATED ORAL at 08:31

## 2020-04-11 RX ADMIN — Medication 10 ML: at 08:32

## 2020-04-11 RX ADMIN — LISINOPRIL 10 MG: 5 TABLET ORAL at 08:31

## 2020-04-11 RX ADMIN — INSULIN LISPRO 2 UNITS: 100 INJECTION, SOLUTION INTRAVENOUS; SUBCUTANEOUS at 18:12

## 2020-04-11 RX ADMIN — PRIMIDONE 50 MG: 50 TABLET ORAL at 20:17

## 2020-04-11 RX ADMIN — HYDROXYCHLOROQUINE SULFATE 200 MG: 200 TABLET, FILM COATED ORAL at 20:17

## 2020-04-11 RX ADMIN — ALBUTEROL SULFATE 2 PUFF: 90 AEROSOL, METERED RESPIRATORY (INHALATION) at 07:07

## 2020-04-11 RX ADMIN — ALBUTEROL SULFATE 2 PUFF: 90 AEROSOL, METERED RESPIRATORY (INHALATION) at 19:38

## 2020-04-11 RX ADMIN — ATORVASTATIN CALCIUM 20 MG: 20 TABLET, FILM COATED ORAL at 08:31

## 2020-04-11 RX ADMIN — INSULIN LISPRO 2 UNITS: 100 INJECTION, SOLUTION INTRAVENOUS; SUBCUTANEOUS at 20:56

## 2020-04-11 RX ADMIN — Medication 10 ML: at 20:17

## 2020-04-11 RX ADMIN — INSULIN LISPRO 2 UNITS: 100 INJECTION, SOLUTION INTRAVENOUS; SUBCUTANEOUS at 13:59

## 2020-04-11 NOTE — PLAN OF CARE
Patient is stable on room air. MD offered to discharge patient to home but patient and spouse want patient to stay until CXR is clear.

## 2020-04-11 NOTE — PROGRESS NOTES
[facility] Medicine Services Daily Progress Note      Hospitalist Team  LOS 4 days      Patient Care Team:  Ramses Auguste MD as PCP - General (Family Medicine)    Patient Location: 4128/1      Subjective   Subjective     Chief Complaint / Subjective  Chief Complaint   Patient presents with   • increased SOA Positive COVID test from Health [facility]         Brief Synopsis of Hospital Course/HPI  Patient is a 69-year-old diabetic with hypertension and hyperlipidemia who has been cultured positive since 3/25/2020.  The patient developed worsening dyspnea and was found to be relatively hypoxemic and was readmitted to the hospital with pneumonia.  The patient was placed on hydroxychloroquine per protocol.    Date:  4/8/2020  Overall the patient feels much better today.  He has not gotten up and moved around very much but when he does move around he feels much more short of breath.  Continues to have a dry nonproductive cough.    4/9/2020  Overall the patient reports that he is much less short of breath today.  In his room he had his oxygen off and still maintain his saturation at 95%.    4/11/2020  Today the patient and his wife do not want him to be discharged.  He continues to have issues with cough, but this has been widely observed in patients that are COVID 19 positive.  The patient has been off oxygen for 36 hours at this time and has not had any desaturations.  Nursing does not report any additional issues outside of anxiety.    Review of Systems   Constitution: Positive for decreased appetite and malaise/fatigue.   HENT: Negative.    Eyes: Negative.    Cardiovascular: Positive for dyspnea on exertion.   Respiratory: Positive for cough.         The patient continues to show improvement overall.  He does have a dry cough intermittently, but I did not see any spasms of coughing.   Endocrine: Negative.    Hematologic/Lymphatic: Negative.    Skin: Negative.    Musculoskeletal: Negative.     "  Gastrointestinal: Negative.    Genitourinary: Negative.    Neurological: Negative.    Psychiatric/Behavioral: Negative.    Allergic/Immunologic: Negative.    All other systems reviewed and are negative.    Objective   Objective      Vital Signs  Temp:  [97.4 °F (36.3 °C)-98.6 °F (37 °C)] 97.6 °F (36.4 °C)  Heart Rate:  [74-92] 87  Resp:  [16-25] 22  BP: (130-161)/(70-75) 130/72  Oxygen Therapy  SpO2: 95 %  Pulse Oximetry Type: Continuous  Device (Oxygen Therapy): nasal cannula  Device (Oxygen Therapy): room air  Flow (L/min): 1  Flowsheet Rows      First Filed Value   Admission Height  177.8 cm (70\") Documented at 04/07/2020 1119   Admission Weight  95 kg (209 lb 7 oz) Documented at 04/07/2020 1119        Intake & Output (last 3 days)       04/08 0701 - 04/09 0700 04/09 0701 - 04/10 0700 04/10 0701 - 04/11 0700 04/11 0701 - 04/12 0700    P.O. 0494 736 7064 100    I.V. (mL/kg) 1821 (19.2)       Total Intake(mL/kg) 3306 (34.8) 600 (6.3) 1200 (12.6) 100 (1.1)    Urine (mL/kg/hr) 3650 (1.6) 2900 (1.3) 80918.6 (18.3) 1525 (2)    Stool   0     Total Output 3650 2900 10636.6 1525    Net 344 -2300 -32603.6 -1425            Urine Unmeasured Occurrence   1 x     Stool Unmeasured Occurrence   1 x         Lines, Drains & Airways    Active LDAs     Name:   Placement date:   Placement time:   Site:   Days:    Peripheral IV 04/07/20 1223 Right Antecubital   04/07/20    1223    Antecubital   1            Physical Exam:    Physical Exam   Constitutional: He is oriented to person, place, and time. He appears well-developed and well-nourished. No distress.   HENT:   Head: Normocephalic and atraumatic.   Right Ear: External ear normal.   Left Ear: External ear normal.   Nose: Nose normal.   Mouth/Throat: Oropharynx is clear and moist. No oropharyngeal exudate.   Eyes: Pupils are equal, round, and reactive to light. Conjunctivae and EOM are normal. Right eye exhibits no discharge. Left eye exhibits no discharge. No scleral icterus. "   Neck: Normal range of motion. No JVD present. No tracheal deviation present. No thyromegaly present.   Cardiovascular: Normal rate, regular rhythm, normal heart sounds and intact distal pulses. Exam reveals no gallop and no friction rub.   No murmur heard.  Pulmonary/Chest: Effort normal. No stridor. No respiratory distress. He has no wheezes. He has no rales. He exhibits no tenderness.   The patient's chest exam continues to improve daily.     Abdominal: Soft. Bowel sounds are normal. He exhibits no distension and no mass. There is no tenderness. There is no rebound and no guarding. No hernia.   Musculoskeletal: Normal range of motion. He exhibits no edema, tenderness or deformity.   Lymphadenopathy:     He has no cervical adenopathy.   Neurological: He is alert and oriented to person, place, and time. No cranial nerve deficit or sensory deficit. He exhibits normal muscle tone. Coordination normal.   Skin: Skin is warm and dry. No rash noted. He is not diaphoretic. No erythema.   Psychiatric: He has a normal mood and affect. His behavior is normal.   Nursing note and vitals reviewed.        Procedures:  Rest and exercise oximetry were done today which demonstrated no desaturations below 90 on a 6-minute walk test.    Results Review:     I reviewed the patient's new clinical results.    Lab Results (last 24 hours)     Procedure Component Value Units Date/Time    Blood Culture - Blood, Arm, Left [[id number]] Collected:  04/07/20 1147    Specimen:  Blood from Arm, Left Updated:  04/11/20 1231     Blood Culture No growth at 4 days    Blood Culture - Blood, Arm, Right [[id number]] Collected:  04/07/20 1147    Specimen:  Blood from Arm, Right Updated:  04/11/20 1231     Blood Culture No growth at 4 days    POC Glucose Once [[id number]]  (Abnormal) Collected:  04/11/20 1128    Specimen:  Blood Updated:  04/11/20 1129     Glucose 174 mg/dL      Comment: Serial Number: [id number]:  [id number]       POC Glucose Once  [[id number]]  (Abnormal) Collected:  04/11/20 0805    Specimen:  Blood Updated:  04/11/20 0806     Glucose 142 mg/dL      Comment: Serial Number: [id number]:  [id number]       Basic Metabolic Panel [[id number]]  (Abnormal) Collected:  04/11/20 0445    Specimen:  Blood Updated:  04/11/20 0614     Glucose 166 mg/dL      BUN 14 mg/dL      Creatinine 0.94 mg/dL      Sodium 133 mmol/L      Potassium 4.5 mmol/L      Chloride 97 mmol/L      CO2 22.0 mmol/L      Calcium 8.7 mg/dL      eGFR Non African Amer 79 mL/min/1.73      BUN/Creatinine Ratio 14.9     Anion Gap 14.0 mmol/L     Narrative:       GFR Normal >60  Chronic Kidney Disease <60  Kidney Failure <15      CBC Auto Differential [[id number]]  (Abnormal) Collected:  04/11/20 0445    Specimen:  Blood Updated:  04/11/20 0545     WBC 8.10 10*3/mm3      RBC 3.39 10*6/mm3      Hemoglobin 10.9 g/dL      Hematocrit 31.2 %      MCV 92.1 fL      MCH 32.1 pg      MCHC 34.9 g/dL      RDW 13.5 %      RDW-SD 43.8 fl      MPV 8.3 fL      Platelets 409 10*3/mm3      Neutrophil % 63.2 %      Lymphocyte % 20.9 %      Monocyte % 14.3 %      Eosinophil % 1.3 %      Basophil % 0.3 %      Neutrophils, Absolute 5.10 10*3/mm3      Lymphocytes, Absolute 1.70 10*3/mm3      Monocytes, Absolute 1.20 10*3/mm3      Eosinophils, Absolute 0.10 10*3/mm3      Basophils, Absolute 0.00 10*3/mm3      nRBC 0.0 /100 WBC     POC Glucose Once [[id number]]  (Abnormal) Collected:  04/10/20 2028    Specimen:  Blood Updated:  04/10/20 2030     Glucose 209 mg/dL      Comment: Serial Number: [id number]:  [id number]       POC Glucose Once [[id number]]  (Abnormal) Collected:  04/10/20 1634    Specimen:  Blood Updated:  04/10/20 1636     Glucose 207 mg/dL      Comment: Serial Number: [id number]:  [id number]           No results found for: HGBA1C        Results from last 7 days   Lab Units 04/07/20  1211   PH, ARTERIAL pH units 7.462*   PO2 ART mm Hg 70.1*   PCO2, ARTERIAL mm Hg 27.6*   HCO3 ART mmol/L  19.7*     No results found for: LIPASE  No results found for: CHOL, CHLPL, TRIG, HDL, LDL, LDLDIRECT    No results found for: INTRAOP, PREDX, FINALDX, COMDX    Microbiology Results (last 10 days)     Procedure Component Value - Date/Time    Blood Culture - Blood, Arm, Left [[id number]] Collected:  04/07/20 1147    Lab Status:  Preliminary result Specimen:  Blood from Arm, Left Updated:  04/11/20 1231     Blood Culture No growth at 4 days    Blood Culture - Blood, Arm, Right [[id number]] Collected:  04/07/20 1147    Lab Status:  Preliminary result Specimen:  Blood from Arm, Right Updated:  04/11/20 1231     Blood Culture No growth at 4 days        ECG/EMG Results (most recent)     Procedure Component Value Units Date/Time    ECG 12 Lead [[id number]] Collected:  04/08/20 0724     Updated:  04/08/20 0730    Narrative:       HEART RATE= 73  bpm  RR Interval= 820  ms  AR Interval= 166  ms  P Horizontal Axis= -53  deg  P Front Axis= 79  deg  QRSD Interval= 88  ms  QT Interval= 399  ms  QRS Axis= -35  deg  T Wave Axis= 36  deg  - ABNORMAL ECG -  Sinus rhythm  Probable left atrial enlargement  Left axis deviation  Consider anteroseptal infarct  Electronically Signed By:   Date and Time of Study: 2020-04-08 07:24:38    ECG 12 Lead [[id number]] Collected:  04/09/20 0613     Updated:  04/09/20 0615    Narrative:       HEART RATE= 74  bpm  RR Interval= 808  ms  AR Interval= 164  ms  P Horizontal Axis= -5  deg  P Front Axis= 63  deg  QRSD Interval= 89  ms  QT Interval= 397  ms  QRS Axis= -26  deg  T Wave Axis= 33  deg  - NORMAL ECG -  Sinus rhythm  Electronically Signed By:   Date and Time of Study: 2020-04-09 06:13:32    ECG 12 Lead [[id number]] Collected:  04/07/20 1153     Updated:  04/10/20 1402    Narrative:       HEART RATE= 97  bpm  RR Interval= 617  ms  AR Interval=   ms  P Horizontal Axis=   deg  P Front Axis=   deg  QRSD Interval= 81  ms  QT Interval= 343  ms  QRS Axis= -51  deg  T Wave Axis= 70  deg  - ABNORMAL ECG  -  Atrial fibrillation  LAD, consider left anterior fascicular block  Consider anterior infarct  No previous ECG available for comparison  Electronically Signed By: Bishnu Rooney (DEAN) 10-Apr-2020 14:02:44  Date and Time of Study: 2020-04-07 11:53:51              Xr Chest 1 View    Result Date: 4/11/2020  Subtle patchy groundglass opacities in both lungs, increased in the right lung base, likely atypical pneumonia/COVID 19 viral pneumonia given the clinical history.  Electronically Signed By-Luis Monsivais On:4/11/2020 8:33 AM This report was finalized on [id number] by  Luis Monsivais, .    Xr Chest 1 View    Result Date: 4/10/2020  Mild left basilar airspace disease is unchanged, which could reflect atelectasis or pneumonia.  Electronically Signed By-DR. Alfonzo Tyler MD On:4/10/2020 3:05 PM This report was finalized on [id number] by DR. Alfonzo Tyler MD.    Xr Chest 1 View    Result Date: 4/9/2020  1. Mild improvement of the left-sided airspace disease with stable appearance of the right lung.  Electronically Signed By-Parish Barbour On:4/9/2020 10:53 PM This report was finalized on [id number] by  Parish Barbour, .    Xr Chest 1 View    Result Date: 4/9/2020  Worsening progression is suggested radiographically with increased or new bilateral infiltrates. The findings may represent pulmonary edema. Infectious multifocal pneumonia cannot be excluded.  Electronically Signed By-Dr. Ramses Boyle MD On:4/9/2020 5:43 AM This report was finalized on [id number] by Dr. Ramses Boyle MD.    Xr Chest 1 View    Result Date: 4/7/2020  No prior for comparison. Patchy linear opacity lung bases could be seen with atelectasis or developing pneumonia. Chronic changes are also not excluded.  Electronically Signed By-Marcella Downs MD On:4/7/2020 12:39 PM This report was finalized on [id number] by  Marcella Downs MD.    Xrays, labs reviewed personally by physician.    Medication Review:   I have reviewed the  patient's current medication list  Scheduled Meds    albuterol sulfate HFA 2 puff Inhalation 4x Daily - RT   atorvastatin 20 mg Oral Daily   hydroxychloroquine 200 mg Oral Q12H   insulin lispro 0-7 Units Subcutaneous 4x Daily With Meals & Nightly   lisinopril 10 mg Oral Daily   primidone 50 mg Oral Nightly   sodium chloride 10 mL Intravenous Q12H     Meds Infusions    Pharmacy Consult      Meds PRN  •  acetaminophen **OR** acetaminophen **OR** acetaminophen  •  aluminum-magnesium hydroxide-simethicone  •  bisacodyl  •  bisacodyl  •  dextrose  •  dextrose  •  glucagon (human recombinant)  •  HYDROcodone-acetaminophen  •  HYDROcodone-acetaminophen  •  insulin lispro **AND** insulin lispro  •  magnesium hydroxide  •  melatonin  •  ondansetron **OR** ondansetron  •  Pharmacy Consult  •  [COMPLETED] Insert peripheral IV **AND** sodium chloride  •  sodium chloride    Assessment/Plan   Assessment/Plan     Active Hospital Problems    Diagnosis  POA   • **Acute respiratory failure with hypoxia (CMS/HCC) [J96.01]  Yes   • COVID-19 virus detected [U07.1]  Yes   • Pneumonia due to COVID-19 virus [U07.1, J12.89]  Yes   • Hyperlipidemia [E78.5]  Yes   • 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   MEDICAL DECISION MAKING COMPLEXITY BY PROBLEM:   1.  COVID 19 pneumonia  -Manifested by hypoxemia corrects with 2 L/min per nasal cannula of oxygen  -Continue antipyretics and inhalers  -Continue hydroxychloroquine as per protocol  -Discontinue azithromycin  -Repeat chest x-ray this a.m. looked worse but may be due to volume status.  -We will repeat chest x-ray in the morning again  -Bumex 1 mg IV yesterday  -Repeat electrolytes and BNP in the a.m.    I had an extensive  "discussion with the patient's wife on 4/10/2020.  She apparently has been calling the nurses station repetitively as she is on accepting of the patient being discharged and she is aware he is \"perfectly well\".  She is very concerned that the patient will decompensate at home and that he will be reluctant to come back to the hospital as he apparently has done that once before during this illness.  She is also concerned that she will not \"be able to monitor him\" because she does not have pulse oximetry and oxygen.  I discussed with the wife the fact that the patient did not qualify for home oxygen had been up for a 6-minute walk test without desaturating.  She basically refused to take the patient home today but was told that he will be discharged tomorrow morning if he has no issues the rest of today and tonight.  She also does not want him sent out on hydroxychloroquine.  I spent a large amount of time explaining to the patient that this is not a new drug and that it has been safely used for decades for collagen vascular diseases and malaria.  I also discussed with her the fact that the patient will only need an additional days of therapy when he is discharged.  I also discussed with her the fact that the patient will need an outpatient follow-up.  The patient's wife did not know how he would get that because apparently his primary care provider is not doing video patient visits.  I have asked that she contact the physician that they regularly use and see what they are doing for other patients in their practice that have been affected by COVID.  He will also need a chest x-ray 1 week after discharge and will need to be followed with chest x-rays until this resolves.    2.  New onset atrial fibrillation  -His rate is well controlled  -Patient is back in sinus rhythm at this time    3.  Hyponatremia  -Patient is now slightly hyponatremic with a sodium of 129 today  -Continue to follow    4.  Hypertension  -Continue " home medications    5.  Hyperlipidemia  -Continue home statin    6.  Type 2 diabetes mellitus  -Sliding scale insulin with Accu-Cheks q. before meals and at bedtime  -Continue to hold metformin while in the hospital    7.  Essential tremor  -Continue primidone    I spoken with the patient and he and his wife want his chest x-ray to be normalized and his pneumonia to be clear prior to discharge.  I have expressed to him that this is an unrealistic expectation as with a generalized pneumonia that is bacterial it can take 6 weeks to 3 months for the x-ray to clear.  The patient also wants his cough to be resolved which may be unrealistic as well.  The patient does have cough medication has been assured that he will be sent home on medication to keep his cough in check.  We will hold off on discharge for now, however it does seem that the patient has reached maximal benefit from this hospital stay for this issue.    VTE Prophylaxis -   Mechanical Order History:      Ordered        04/07/20 1330  Place Sequential Compression Device  Once         04/07/20 1330  Maintain Sequential Compression Device  Continuous                 Pharmalogical Order History:     None      Code Status -   Code Status and Medical Interventions:   Ordered at: 04/07/20 1330     Level Of Support Discussed With:    Patient     Code Status:    CPR     Medical Interventions (Level of Support Prior to Arrest):    Full     Discharge Planning    Destination      Coordination has not been started for this encounter.      Durable Medical Equipment      Coordination has not been started for this encounter.      Dialysis/Infusion      Coordination has not been started for this encounter.      Home Medical Care      Coordination has not been started for this encounter.      Therapy      Coordination has not been started for this encounter.      Community Resources      Coordination has not been started for this encounter.      Electronically signed by Shelbi  MD Christopher, 04/11/20, 15:04.  [facility]ist Team

## 2020-04-12 ENCOUNTER — READMISSION MANAGEMENT (OUTPATIENT)
Dept: CALL CENTER | Facility: HOSPITAL | Age: 70
End: 2020-04-12

## 2020-04-12 ENCOUNTER — NURSE TRIAGE (OUTPATIENT)
Dept: CALL CENTER | Facility: HOSPITAL | Age: 70
End: 2020-04-12

## 2020-04-12 VITALS
HEIGHT: 70 IN | SYSTOLIC BLOOD PRESSURE: 145 MMHG | WEIGHT: 209.22 LBS | DIASTOLIC BLOOD PRESSURE: 68 MMHG | OXYGEN SATURATION: 95 % | BODY MASS INDEX: 29.95 KG/M2 | RESPIRATION RATE: 16 BRPM | TEMPERATURE: 98.1 F | HEART RATE: 72 BPM

## 2020-04-12 PROBLEM — I48.91 NEW ONSET ATRIAL FIBRILLATION (HCC): Status: RESOLVED | Noted: 2020-04-07 | Resolved: 2020-04-12

## 2020-04-12 LAB
ANION GAP SERPL CALCULATED.3IONS-SCNC: 11 MMOL/L (ref 5–15)
BACTERIA SPEC AEROBE CULT: NORMAL
BACTERIA SPEC AEROBE CULT: NORMAL
BASOPHILS # BLD AUTO: 0 10*3/MM3 (ref 0–0.2)
BASOPHILS NFR BLD AUTO: 0.6 % (ref 0–1.5)
BUN BLD-MCNC: 16 MG/DL (ref 8–23)
BUN/CREAT SERPL: 16 (ref 7–25)
CALCIUM SPEC-SCNC: 9.1 MG/DL (ref 8.6–10.5)
CHLORIDE SERPL-SCNC: 98 MMOL/L (ref 98–107)
CO2 SERPL-SCNC: 23 MMOL/L (ref 22–29)
CREAT BLD-MCNC: 1 MG/DL (ref 0.76–1.27)
DEPRECATED RDW RBC AUTO: 44.2 FL (ref 37–54)
EOSINOPHIL # BLD AUTO: 0.1 10*3/MM3 (ref 0–0.4)
EOSINOPHIL NFR BLD AUTO: 1.2 % (ref 0.3–6.2)
ERYTHROCYTE [DISTWIDTH] IN BLOOD BY AUTOMATED COUNT: 13.7 % (ref 12.3–15.4)
GFR SERPL CREATININE-BSD FRML MDRD: 74 ML/MIN/1.73
GLUCOSE BLD-MCNC: 160 MG/DL (ref 65–99)
GLUCOSE BLDC GLUCOMTR-MCNC: 141 MG/DL (ref 70–105)
GLUCOSE BLDC GLUCOMTR-MCNC: 157 MG/DL (ref 70–105)
HCT VFR BLD AUTO: 33 % (ref 37.5–51)
HGB BLD-MCNC: 11.7 G/DL (ref 13–17.7)
LYMPHOCYTES # BLD AUTO: 1.6 10*3/MM3 (ref 0.7–3.1)
LYMPHOCYTES NFR BLD AUTO: 20.5 % (ref 19.6–45.3)
MCH RBC QN AUTO: 32.7 PG (ref 26.6–33)
MCHC RBC AUTO-ENTMCNC: 35.5 G/DL (ref 31.5–35.7)
MCV RBC AUTO: 92 FL (ref 79–97)
MONOCYTES # BLD AUTO: 1 10*3/MM3 (ref 0.1–0.9)
MONOCYTES NFR BLD AUTO: 13.2 % (ref 5–12)
NEUTROPHILS # BLD AUTO: 5.1 10*3/MM3 (ref 1.7–7)
NEUTROPHILS NFR BLD AUTO: 64.5 % (ref 42.7–76)
NRBC BLD AUTO-RTO: 0 /100 WBC (ref 0–0.2)
PLATELET # BLD AUTO: 449 10*3/MM3 (ref 140–450)
PMV BLD AUTO: 8.5 FL (ref 6–12)
POTASSIUM BLD-SCNC: 4.7 MMOL/L (ref 3.5–5.2)
RBC # BLD AUTO: 3.59 10*6/MM3 (ref 4.14–5.8)
SODIUM BLD-SCNC: 132 MMOL/L (ref 136–145)
WBC NRBC COR # BLD: 7.9 10*3/MM3 (ref 3.4–10.8)

## 2020-04-12 PROCEDURE — 82962 GLUCOSE BLOOD TEST: CPT

## 2020-04-12 PROCEDURE — 99239 HOSP IP/OBS DSCHRG MGMT >30: CPT | Performed by: INTERNAL MEDICINE

## 2020-04-12 PROCEDURE — 94799 UNLISTED PULMONARY SVC/PX: CPT

## 2020-04-12 PROCEDURE — 85025 COMPLETE CBC W/AUTO DIFF WBC: CPT | Performed by: INTERNAL MEDICINE

## 2020-04-12 PROCEDURE — 80048 BASIC METABOLIC PNL TOTAL CA: CPT | Performed by: INTERNAL MEDICINE

## 2020-04-12 RX ORDER — BENZONATATE 100 MG/1
200 CAPSULE ORAL 3 TIMES DAILY PRN
Qty: 30 CAPSULE | Refills: 0 | Status: SHIPPED | OUTPATIENT
Start: 2020-04-12 | End: 2020-08-25

## 2020-04-12 RX ORDER — ONDANSETRON 4 MG/1
4 TABLET, FILM COATED ORAL EVERY 6 HOURS PRN
Qty: 20 TABLET | Refills: 0 | Status: SHIPPED | OUTPATIENT
Start: 2020-04-12 | End: 2021-03-16

## 2020-04-12 RX ORDER — ALBUTEROL SULFATE 90 UG/1
2 AEROSOL, METERED RESPIRATORY (INHALATION)
Qty: 2 INHALER | Refills: 0 | Status: SHIPPED | OUTPATIENT
Start: 2020-04-12 | End: 2021-03-16

## 2020-04-12 RX ADMIN — ALBUTEROL SULFATE 2 PUFF: 90 AEROSOL, METERED RESPIRATORY (INHALATION) at 07:00

## 2020-04-12 RX ADMIN — LISINOPRIL 10 MG: 5 TABLET ORAL at 08:39

## 2020-04-12 RX ADMIN — HYDROXYCHLOROQUINE SULFATE 200 MG: 200 TABLET, FILM COATED ORAL at 08:39

## 2020-04-12 RX ADMIN — Medication 10 ML: at 08:39

## 2020-04-12 RX ADMIN — ATORVASTATIN CALCIUM 20 MG: 20 TABLET, FILM COATED ORAL at 08:39

## 2020-04-12 NOTE — DISCHARGE SUMMARY
[facility]  DISCHARGE SUMMARY        Prepared For PCP:  Ramses Auguste MD    Patient Name: Tone Weber  : 1950  MRN: [id number]      Date of Admission:   2020    Date of Discharge:  2020    Length of stay:  LOS: 5 days     Hospital Course     Presenting Problem:   Dyspnea, unspecified type [R06.00]  Sepsis, due to unspecified organism, unspecified whether acute organ dysfunction present (CMS/[facility]) [A41.9]  COVID-19 virus detected [U07.1]      Active Hospital Problems    Diagnosis  POA   • **Acute respiratory failure with hypoxia (CMS/[facility]) [J96.01]  Yes     Priority: High   • COVID-19 virus detected [U07.1]  Yes     Priority: High   • Pneumonia due to COVID-19 virus [U07.1, J12.89]  Yes     Priority: High   • Hyperlipidemia [E78.5]  Yes     Priority: Medium   • Hypertension [I10]  Yes     Priority: Medium   • Type 2 diabetes mellitus without complications (CMS/[facility]) [E11.9]  Yes     Priority: Medium   • Dysphonia of organic tremor [R25.1, R49.0]  Yes     Priority: Low   • Hyponatremia [E87.1]  Yes      Resolved Hospital Problems    Diagnosis Date Resolved POA   • New onset atrial fibrillation (CMS/[facility]) [I48.91] 2020 Yes           Hospital Course:  Tone Weber is a 69 y.o. male deviously known COVID 19 positive who re-presented for increasing shortness of breath.  The patient was found to have a pneumonia likely related to the coded and was placed on a azithromycin and hydroxychloroquine.  The patient's oxygen was titrated to maintain a saturation of greater than 92%.  The patient was given breathing treatment as well.  The patient's blood count and renal functions were monitored.  The patient did very well and passed a 6-minute walk test.  The patient and his wife are initially reluctant to go home but the patient was kept 2 additional days to ensure that there were no complications at the patient and his wife's request.  The patient did not have any  relapses and in fact continued to improve.    The patient is a full code at the time of discharge.  The patient has no pending studies at discharge.  The patient concluded his hydroxychloroquine and a azithromycin regime while in the hospital.  The patient's medications are as listed in that section of this report.  The patient will need to follow-up with his primary care provider in 3 to 5 days and to have a chest x-ray in 1 week's time.  The patient will need to resume his activities gradually as he is able to tolerate.  He and his wife will still need to quarantine in place.  The patient is discharged in satisfactory condition.  His diet is a consistent carbohydrate cardiac healthy heart diet.  Should have glucose monitoring q. before meals and at bedtime.    Recommendation for Outpatient Providers:     Reasons For Change In Medications and Indications for New Medications:    Day of Discharge     HPI:   Patient shows improvement and has no specific complaints outside of dyspnea with exertion and cough.    Vital Signs:   Temp:  [97.6 °F (36.4 °C)-98.3 °F (36.8 °C)] 97.8 °F (36.6 °C)  Heart Rate:  [72-87] 72  Resp:  [15-22] 15  BP: (123-155)/(64-77) 123/66     Physical Exam:  Physical Exam   Constitutional: He is oriented to person, place, and time. He appears well-developed and well-nourished. No distress.   HENT:   Head: Normocephalic and atraumatic.   Right Ear: External ear normal.   Left Ear: External ear normal.   Nose: Nose normal.   Mouth/Throat: Oropharynx is clear and moist. No oropharyngeal exudate.   Eyes: Pupils are equal, round, and reactive to light. Conjunctivae and EOM are normal. Right eye exhibits no discharge. Left eye exhibits no discharge. No scleral icterus.   Neck: Normal range of motion. No JVD present. No tracheal deviation present. No thyromegaly present.   Cardiovascular: Normal rate, regular rhythm, normal heart sounds and intact distal pulses. Exam reveals no gallop and no friction  rub.   No murmur heard.  Pulmonary/Chest: Effort normal and breath sounds normal. No stridor. No respiratory distress. He has no wheezes. He has no rales. He exhibits no tenderness.   Abdominal: Soft. Bowel sounds are normal. He exhibits no distension and no mass. There is no tenderness. There is no rebound and no guarding. No hernia.   Musculoskeletal: Normal range of motion. He exhibits no edema, tenderness or deformity.   Lymphadenopathy:     He has no cervical adenopathy.   Neurological: He is alert and oriented to person, place, and time. No cranial nerve deficit or sensory deficit. He exhibits normal muscle tone. Coordination normal.   Skin: Skin is warm and dry. No rash noted. He is not diaphoretic. No erythema.   Psychiatric: He has a normal mood and affect. His behavior is normal.   Nursing note and vitals reviewed.      Pertinent  and/or Most Recent Results     Results from last 7 days   Lab Units 04/12/20  0757 04/12/20  0417 04/11/20  0445 04/10/20  0352 04/09/20  0302 04/08/20  0256 04/07/20  1148   WBC 10*3/mm3  --  7.90 8.10 7.10 6.80 7.50 10.80   HEMOGLOBIN g/dL  --  11.7* 10.9* 11.0* 10.7* 11.1* 13.3   HEMATOCRIT %  --  33.0* 31.2* 31.7* 31.6* 31.7* 36.8*   PLATELETS 10*3/mm3  --  449 409 344 296 249 264   SODIUM mmol/L 132*  --  133* 134* 133* 129* 128*   POTASSIUM mmol/L 4.7  --  4.5 4.4 4.6 4.6 4.4   CHLORIDE mmol/L 98  --  97* 98 100 97* 90*   CO2 mmol/L 23.0  --  22.0 22.0 21.0* 18.0* 21.0*   BUN mg/dL 16  --  14 15 11 11 11   CREATININE mg/dL 1.00  --  0.94 0.98 1.15 0.91 1.19   GLUCOSE mg/dL 160*  --  166* 171* 192* 159* 182*   CALCIUM mg/dL 9.1  --  8.7 8.7 8.2* 7.9* 8.5*     Results from last 7 days   Lab Units 04/07/20  1148   BILIRUBIN mg/dL 0.5   ALK PHOS U/L 166*   ALT (SGPT) U/L 91*   AST (SGOT) U/L 73*           Invalid input(s): TG, LDLCALC, LDLREALC  Results from last 7 days   Lab Units 04/09/20  0905 04/07/20  1839 04/07/20  1153 04/07/20  1148   HEMOGLOBIN A1C %  --   --   --   7.3*   PROBNP pg/mL 190.2  --   --  97.2   TROPONIN T ng/mL  --   --   --  <0.010   PROCALCITONIN ng/mL  --   --   --  0.50*   LACTATE mmol/L  --  1.6 2.8*  --      Brief Urine Lab Results  (Last result in the past 365 days)      Color   Clarity   Blood   Leuk Est   Nitrite   Protein   CREAT   Urine HCG        04/07/20 1308 Yellow Clear Trace Negative Negative 100 mg/dL (2+)             Microbiology Results Abnormal     Procedure Component Value - Date/Time    Blood Culture - Blood, Arm, Left [[id number]] Collected:  04/07/20 1147    Lab Status:  Preliminary result Specimen:  Blood from Arm, Left Updated:  04/11/20 1231     Blood Culture No growth at 4 days    Blood Culture - Blood, Arm, Right [[id number]] Collected:  04/07/20 1147    Lab Status:  Preliminary result Specimen:  Blood from Arm, Right Updated:  04/11/20 1231     Blood Culture No growth at 4 days        Xr Chest 1 View    Result Date: 4/11/2020  Impression: Subtle patchy groundglass opacities in both lungs, increased in the right lung base, likely atypical pneumonia/COVID 19 viral pneumonia given the clinical history.  Electronically Signed By-Luis Monsivais On:4/11/2020 8:33 AM This report was finalized on [id number] by  Luis Monsivais, .    Xr Chest 1 View    Result Date: 4/10/2020  Impression: Mild left basilar airspace disease is unchanged, which could reflect atelectasis or pneumonia.  Electronically Signed By-DR. Alfonzo Tyler MD On:4/10/2020 3:05 PM This report was finalized on [id number] by DR. Alfonzo Tyler MD.    Xr Chest 1 View    Result Date: 4/9/2020  Impression: 1. Mild improvement of the left-sided airspace disease with stable appearance of the right lung.  Electronically Signed By-Parish Barbour On:4/9/2020 10:53 PM This report was finalized on [id number] by  Parish Barbour, .    Xr Chest 1 View    Result Date: 4/9/2020  Impression: Worsening progression is suggested radiographically with increased or new bilateral  infiltrates. The findings may represent pulmonary edema. Infectious multifocal pneumonia cannot be excluded.  Electronically Signed By-Dr. Ramses Boyle MD On:4/9/2020 5:43 AM This report was finalized on [id number] by Dr. Ramses Boyle MD.    Xr Chest 1 View    Result Date: 4/7/2020  Impression: No prior for comparison. Patchy linear opacity lung bases could be seen with atelectasis or developing pneumonia. Chronic changes are also not excluded.  Electronically Signed By-Marcella Downs MD On:4/7/2020 12:39 PM This report was finalized on [id number] by  Marcella Downs MD.             Test Results Pending at Discharge   Order Current Status    Blood Culture - Blood, Arm, Left Preliminary result    Blood Culture - Blood, Arm, Right Preliminary result      Procedures Performed     Consults:   Consults     Date and Time Order Name Status Description    4/7/2020 1200 Hospitalist (on-call MD unless specified) Completed           Discharge Details        Discharge Medications      New Medications      Instructions Start Date   albuterol sulfate  (90 Base) MCG/ACT inhaler  Commonly known as:  PROVENTIL HFA;VENTOLIN HFA;PROAIR HFA   2 puffs, Inhalation, 4 Times Daily - RT      benzonatate 100 MG capsule  Commonly known as:  Tessalon Perles   200 mg, Oral, 3 Times Daily PRN      HYDROcodone-homatropine 5-1.5 MG/5ML syrup  Commonly known as:  HYCODAN   5 mL, Oral, Every 6 Hours PRN      ondansetron 4 MG tablet  Commonly known as:  ZOFRAN   4 mg, Oral, Every 6 Hours PRN         Continue These Medications      Instructions Start Date   aspirin 81 MG chewable tablet   81 mg, Oral, Every Other Day      ezetimibe 10 MG tablet  Commonly known as:  ZETIA   10 mg, Oral, Daily      ibuprofen 200 MG tablet  Commonly known as:  ADVIL,MOTRIN   200 mg, Oral, Every 6 Hours PRN      lisinopril 10 MG tablet  Commonly known as:  PRINIVIL,ZESTRIL   10 mg, Oral, Daily      metFORMIN 1000 MG tablet  Commonly known as:  GLUCOPHAGE    1,000 mg, Oral, 2 Times Daily With Meals      primidone 50 MG tablet  Commonly known as:  MYSOLINE   TAKE 1 TABLET BY MOUTH AT BEDTIME. MAY INCREASE TO 2 TABLET AT BEDTIME AFTER 2 WEEKS      simvastatin 40 MG tablet  Commonly known as:  ZOCOR   40 mg, Oral, Daily           No Known Allergies    Discharge Disposition:  Home or Self Care    Diet:  Hospital:  Diet Order   Procedures   • Diet Diabetic/Consistent Carbs; Diabetic - Consistent Carb   Discharge Activity:   As tolerated      CODE STATUS:    Code Status and Medical Interventions:   Ordered at: 04/07/20 1330     Level Of Support Discussed With:    Patient     Code Status:    CPR     Medical Interventions (Level of Support Prior to Arrest):    Full   Follow-up Appointments  Future Appointments   Date Time Provider Department Center   8/25/2020  1:45 PM Seipel, Joseph F, MD [facility]           Condition on Discharge:    Stable  This patient has been examined wearing appropriate Personal Protective Equipment and discussed with hospital infection control department. 04/12/20    Electronically signed by Shelbi White MD, 04/12/20, 11:20 AM.    Time: I spent  45  minutes on this discharge activity which included face-to-face encounter with the patient/reviewing the data in the system/coordination of the care with the nursing staff as well as consultants/documentation/entering orders.

## 2020-04-12 NOTE — OUTREACH NOTE
Prep Survey      Responses   [facility]?  [facility]   Is LACE score < 7 ?  No   Eligibility  Readm Mgmt   Discharge diagnosis  acute resp failure, PN, covid pos   COVID-19 Test Status  Confirmed   Does the patient have one of the following disease processes/diagnoses(primary or secondary)?  COPD/Pneumonia   Does the patient have Home health ordered?  No   Is there a DME ordered?  No   Comments regarding appointments  Pt should have appt with current PCP - please clarify and update care team   Prep survey completed?  Yes          Shelly Hurt RN

## 2020-04-12 NOTE — PLAN OF CARE
Patient is being discharged home. Has remained on O2, fever free, self sufficient, has showered. Wife has called several times today with questions and have reassured her patient is feeling better and he states he is ready to go home.

## 2020-04-12 NOTE — TELEPHONE ENCOUNTER
"There was a doctor Molina listed on his AVS to follow up with. Looking back he saw this physician in 2015 at an urgent care. He has a PCP, told to follow up with his regular PCP. Told caller to follow instruction from state health department about how long she should isolate from him at home. He has been without fever for over 72 hours and was admitted on day 15 of the beginning of his illness.  Discussed with spouse his AVS and answered her questions in general as she was not able to be with him at this he was in the hospital. Caller states she has a better understanding of what he is to do and has our number with any further questions. I let her know the nurses will be calling to check on him as part of his follow up, so expect calls from the hospital in 1 to 2 days for the first call.     Reason for Disposition  • Information only question and nurse able to answer    Additional Information  • Negative: Nursing judgment  • Negative: Nursing judgment  • Negative: Nursing judgment  • Negative: Nursing judgment    Answer Assessment - Initial Assessment Questions  1. REASON FOR CALL or QUESTION: \"What is your reason for calling today?\" or \"How can I best help you?\" or \"What question do you have that I can help answer?\"      Questions about his AVS. Wife asking if she needs to continue to to isolate from him, he has been fever free for over 72 hour and he was day 15 into his illness when he was admitted to the hospital.    Protocols used: INFORMATION ONLY CALL - NO TRIAGE-ADULT-[place]      "

## 2020-04-12 NOTE — PLAN OF CARE
Pt vitals stable, continues with occasional non productive cough, no complaints, pt stated of wanting to be discharged tomorrow, independent with care

## 2020-04-13 ENCOUNTER — TELEPHONE (OUTPATIENT)
Dept: URGENT CARE | Facility: CLINIC | Age: 70
End: 2020-04-13

## 2020-04-13 ENCOUNTER — READMISSION MANAGEMENT (OUTPATIENT)
Dept: CALL CENTER | Facility: HOSPITAL | Age: 70
End: 2020-04-13

## 2020-04-13 NOTE — PROGRESS NOTES
Case Management Discharge Note      Final Note: Discharged home self care.    Provided Post Acute Provider List?: N/A         Final Discharge Disposition Code: 01 - home or self-care

## 2020-04-13 NOTE — TELEPHONE ENCOUNTER
I am getting this patient's results since I am the last  provider he saw, but I am not his PCP, of course.  Will you please call to make sure he has or was assigned a PCP for follow-up?    In the interim, if he worsens, then he is to go to the ER.

## 2020-04-13 NOTE — OUTREACH NOTE
COPD/PN Week 1 Survey      Responses   [facility] patient discharged from?  [facility]   COVID-19 Test Status  Confirmed   Does the patient have one of the following disease processes/diagnoses(primary or secondary)?  COPD/Pneumonia   Is there a successful TCM telephone encounter documented?  No   Was the primary reason for admission:  Pneumonia   Week 1 attempt successful?  Yes   Call start time  1147   Call end time  1231   Discharge diagnosis  acute resp failure, PN, covid pos   Is patient permission given to speak with other caregiver?  Yes   List who call center can speak with  wife   Person spoke with today (if not patient) and relationship  wife   Meds reviewed with patient/caregiver?  Yes   Is the patient having any side effects they believe may be caused by any medication additions or changes?  No   Does the patient have all medications ordered at discharge?  Yes   Is the patient taking all medications as directed (includes completed medication regime)?  Yes   Medication comments  Using the inhaler.    Does the patient have a primary care provider?   Yes   Does the patient have an appointment with their PCP or pulmonologist within 7 days of discharge?  Yes   Has the patient kept scheduled appointments due by today?  N/A   Comments  telephone visit scheduled 4-.    Psychosocial issues?  Yes   Psychosocial comments  adult son is drinking & relies upon parents for meds and everything.    Comments  Pt using inhaler every 6hrs. Pt is monitoring temp daily, today 98.7.Pt voice sounds strong, not coughing. Denies HA since 4-. Pt's taste/smell has returned. No runny nose, still fatigue and SOA slightly.    Did the patient receive a copy of their discharge instructions?  Yes   Nursing interventions  Reviewed instructions with patient   What is the patient's perception of their health status since discharge?  Improving   Nursing Interventions  Nurse provided patient education   Is the patient/caregiver  able to teach back the hierarchy of who to call/visit for symptoms/problems? PCP, Specialist, Home health nurse, Urgent Care, ED, 911  Yes   Additional teach back comments  Educated on monitoring daily temps, notify if .0 or >, change toothbrush weekly until symptoms resolved, no temp x3 days w/o meds and 7days passed since first symptoms. Continue good deep breathing/coughing.    Is the patient/caregiver able to teach back signs and symptoms of worsening condition:  Fever/chills, Shortness of breath, Chest pain   Is the patient/caregiver able to teach back importance of completing antibiotic course of treatment?  Yes   Week 1 call completed?  Yes   Wrap up additional comments  Wife is concerned about her quarantine time and when she is free to resume normal life. She has been told mult things. I have given her info based on guidelines, I have recommended she call her PCP as she still has more questions about when can get her allergy shots.           Kristen Pham RN

## 2020-04-14 ENCOUNTER — READMISSION MANAGEMENT (OUTPATIENT)
Dept: CALL CENTER | Facility: HOSPITAL | Age: 70
End: 2020-04-14

## 2020-04-14 NOTE — OUTREACH NOTE
COPD/PN Week 1 Survey      Responses   [facility] patient discharged from?  [facility]   COVID-19 Test Status  Confirmed   Does the patient have one of the following disease processes/diagnoses(primary or secondary)?  COPD/Pneumonia   Is there a successful TCM telephone encounter documented?  No   Was the primary reason for admission:  Pneumonia   Week 1 attempt successful?  Yes   Call start time  0930   Call end time  0945   Meds reviewed with patient/caregiver?  Yes   Is the patient having any side effects they believe may be caused by any medication additions or changes?  No   Does the patient have all medications ordered at discharge?  Yes   Is the patient taking all medications as directed (includes completed medication regime)?  Yes   Comments regarding appointments  PCP appt 04/17/20 by phone   Does the patient have a primary care provider?   Yes   Does the patient have an appointment with their PCP or pulmonologist within 7 days of discharge?  Yes   Has the patient kept scheduled appointments due by today?  N/A   Has home health visited the patient within 72 hours of discharge?  N/A   Psychosocial issues?  No   Psychosocial comments  States is managing fine with wife.   Did the patient receive a copy of their discharge instructions?  Yes   Nursing interventions  Reviewed instructions with patient, Educated on MyChart   What is the patient's perception of their health status since discharge?  Improving   Nursing Interventions  Nurse provided patient education   Are the patient's immunizations up to date?   Yes   Nursing interventions  Educated on importance of maintaining up to date immunizations as advised by provider   Is the patient/caregiver able to teach back the hierarchy of who to call/visit for symptoms/problems? PCP, Specialist, Home health nurse, Urgent Care, ED, 911  Yes   Is the patient/caregiver able to teach back signs and symptoms of worsening condition:  Fever/chills, Shortness of breath,  Chest pain   Is the patient/caregiver able to teach back importance of completing antibiotic course of treatment?  Yes   Week 1 call completed?  Yes   Wrap up additional comments  States is feeling better-still using inhaler as prescribed. Denies any fever, SOA, or chest pain. [facility] continues to quarantine with wife. [facility] has changed toothbrush. [facility] has not checked BS since discharge-encouraged to check BS before each meal and at bedtime as recommended by physician. Denies any complaints today. [facility] still has occasional cough.          Purvi Madera RN

## 2020-04-15 ENCOUNTER — READMISSION MANAGEMENT (OUTPATIENT)
Dept: CALL CENTER | Facility: HOSPITAL | Age: 70
End: 2020-04-15

## 2020-04-15 NOTE — OUTREACH NOTE
COPD/PN Week 1 Survey      Responses   [facility] patient discharged from?  [facility]   COVID-19 Test Status  Confirmed   Does the patient have one of the following disease processes/diagnoses(primary or secondary)?  COPD/Pneumonia   Is there a successful TCM telephone encounter documented?  No   Was the primary reason for admission:  Pneumonia   Week 1 attempt successful?  Yes   Call start time  1151   Call end time  1154   Discharge diagnosis  acute resp failure, PN, covid pos   Meds reviewed with patient/caregiver?  Yes   Is the patient having any side effects they believe may be caused by any medication additions or changes?  No   Does the patient have all medications ordered at discharge?  Yes   Is the patient taking all medications as directed (includes completed medication regime)?  Yes   Does the patient have a primary care provider?   Yes   Does the patient have an appointment with their PCP or pulmonologist within 7 days of discharge?  Yes   Has the patient kept scheduled appointments due by today?  N/A   Comments  Has appt Friday with his PCP   Has home health visited the patient within 72 hours of discharge?  N/A   Psychosocial issues?  No   Did the patient receive a copy of their discharge instructions?  Yes   Nursing interventions  Reviewed instructions with patient   What is the patient's perception of their health status since discharge?  Improving   Nursing Interventions  Nurse provided patient education   Is the patient/caregiver able to teach back the hierarchy of who to call/visit for symptoms/problems? PCP, Specialist, Home health nurse, Urgent Care, ED, 911  Yes   Additional teach back comments  States he is doing well.  No issues with breathing or temp.  States his temp this am was 98.3.  He hasn't checked his bs due to strips were  and wife went out to get more.  So he will check it.     Is the patient/caregiver able to teach back signs and symptoms of worsening condition:   Fever/chills, Shortness of breath, Chest pain   Is the patient/caregiver able to teach back importance of completing antibiotic course of treatment?  Yes   Week 1 call completed?  Yes   Wrap up additional comments  No questions or needs at this time          Karlie Denson LPN

## 2020-04-17 ENCOUNTER — LAB REQUISITION (OUTPATIENT)
Dept: LAB | Facility: HOSPITAL | Age: 70
End: 2020-04-17

## 2020-04-17 ENCOUNTER — NURSE TRIAGE (OUTPATIENT)
Dept: CALL CENTER | Facility: HOSPITAL | Age: 70
End: 2020-04-17

## 2020-04-17 DIAGNOSIS — Z00.00 ENCOUNTER FOR GENERAL ADULT MEDICAL EXAMINATION WITHOUT ABNORMAL FINDINGS: ICD-10-CM

## 2020-04-17 PROCEDURE — 87635 SARS-COV-2 COVID-19 AMP PRB: CPT | Performed by: EMERGENCY MEDICINE

## 2020-04-17 PROCEDURE — U0003 INFECTIOUS AGENT DETECTION BY NUCLEIC ACID (DNA OR RNA); SEVERE ACUTE RESPIRATORY SYNDROME CORONAVIRUS 2 (SARS-COV-2) (CORONAVIRUS DISEASE [COVID-19]), AMPLIFIED PROBE TECHNIQUE, MAKING USE OF HIGH THROUGHPUT TECHNOLOGIES AS DESCRIBED BY CMS-2020-01-R: HCPCS | Performed by: EMERGENCY MEDICINE

## 2020-04-17 NOTE — TELEPHONE ENCOUNTER
" tested positive for COVID and they had a virtual visit today with PCP.   He is on the way now to [facility] a COVID [facility]. She is expecting the results in 2 days.  He has been fever free since 3 days prior to hospital discharge. Doing so much better.   His PCP  Has ordered a CXR for Monday.  Wife is asking about his EKG, he was told that he was in atrial fib and she had not heard this until his visit today with PCP. She was not allowed to be with her  while he was hospitalized. Discussed atrial fib and sometimes people convert to back to a normal heart rhytmn. She says they had told her his EKG was ok. Wife asking how they would know if he was in atrial fib, questions answered. Some S/S a fib reviewed irregular heart beat fast heart beat, palpitations.    Reason for Disposition  • Health Information question, no triage required and triager able to answer question    Additional Information  • Negative: [1] Caller is not with the adult (patient) AND [2] reporting urgent symptoms  • Negative: Lab result questions  • Negative: Medication questions  • Negative: Caller can't be reached by phone  • Negative: Caller has already spoken to PCP or another triager  • Negative: RN needs further essential information from caller in order to complete triage  • Negative: Requesting regular office appointment  • Negative: [1] Caller requesting NON-URGENT health information AND [2] PCP's office is the best resource    Answer Assessment - Initial Assessment Questions  1. REASON FOR CALL or QUESTION: \"What is your reason for calling today?\" or \"How can I best help you?\" or \"What question do you have that I can help answer?\"      See note    Protocols used: INFORMATION ONLY CALL-ADULT-      "

## 2020-04-18 ENCOUNTER — READMISSION MANAGEMENT (OUTPATIENT)
Dept: CALL CENTER | Facility: HOSPITAL | Age: 70
End: 2020-04-18

## 2020-04-18 NOTE — OUTREACH NOTE
COPD/PN Week 1 Survey      Responses   [facility] patient discharged from?  [facility]   COVID-19 Test Status  Confirmed   Does the patient have one of the following disease processes/diagnoses(primary or secondary)?  COPD/Pneumonia   Is there a successful TCM telephone encounter documented?  No   Was the primary reason for admission:  Pneumonia   Week 1 attempt successful?  Yes   Call start time  1133   Call end time  1140   Discharge diagnosis  acute resp failure, PN, covid pos   Meds reviewed with patient/caregiver?  Yes   Is the patient having any side effects they believe may be caused by any medication additions or changes?  No   Does the patient have all medications ordered at discharge?  Yes   Is the patient taking all medications as directed (includes completed medication regime)?  Yes   Comments regarding appointments  PCP appt 04/17/20 by phone   Does the patient have a primary care provider?   Yes   Does the patient have an appointment with their PCP or pulmonologist within 7 days of discharge?  Yes   Has the patient kept scheduled appointments due by today?  Yes   Has home health visited the patient within 72 hours of discharge?  N/A   Psychosocial issues?  No   Did the patient receive a copy of their discharge instructions?  Yes   Nursing interventions  Reviewed instructions with patient   What is the patient's perception of their health status since discharge?  Improving   Nursing Interventions  Nurse provided patient education   Are the patient's immunizations up to date?   Yes   Nursing interventions  Educated on importance of maintaining up to date immunizations as advised by provider   Is the patient/caregiver able to teach back the hierarchy of who to call/visit for symptoms/problems? PCP, Specialist, Home health nurse, Urgent Care, ED, 911  Yes   Additional teach back comments  Pt reports his O2 saturation is 95% on room air. Reports he is afebrile. Reports he still experiences some fatigue,  and has less frequent coughing.    Is the patient/caregiver able to teach back signs and symptoms of worsening condition:  Fever/chills, Shortness of breath, Chest pain   Is the patient/caregiver able to teach back importance of completing antibiotic course of treatment?  Yes   Week 1 call completed?  Yes          Jonathan Hanley RN

## 2020-04-20 ENCOUNTER — NURSE TRIAGE (OUTPATIENT)
Dept: CALL CENTER | Facility: HOSPITAL | Age: 70
End: 2020-04-20

## 2020-04-20 NOTE — TELEPHONE ENCOUNTER
"Callarmand states that her  was admitted 4/7 and discharged 4/12.  He was covid positive.  He had had symptoms since March 29.  Caller states that he is now symptom free and has been afebrile for over a week.  His PCP wanted him to have a CXR today, he went through the health dept for repeat testing prior to CXR and he is still positive.  Caller asking if the hospital will do the CXR with him being positive.  Recommended calling the department and asking, wearing a mask if he does come.  They will quarantine again for 14 days due to still testing positive.  His PCP's office has closed for the day so was unable to ask them.    Reason for Disposition  • Health Information question, no triage required and triager able to answer question    Additional Information  • Negative: [1] Caller is not with the adult (patient) AND [2] reporting urgent symptoms  • Negative: Lab result questions  • Negative: Medication questions  • Negative: Caller can't be reached by phone  • Negative: Caller has already spoken to PCP or another triager  • Negative: RN needs further essential information from caller in order to complete triage  • Negative: Requesting regular office appointment  • Negative: [1] Caller requesting NON-URGENT health information AND [2] PCP's office is the best resource  • Negative: General information question, no triage required and triager able to answer question  • Negative: Question about upcoming scheduled test, no triage required and triager able to answer question  • Negative: [1] Caller is not with the adult (patient) AND [2] probable NON-URGENT symptoms  • Negative: [1] Follow-up call to recent contact AND [2] information only call, no triage required    Answer Assessment - Initial Assessment Questions  1. REASON FOR CALL or QUESTION: \"What is your reason for calling today?\" or \"How can I best help you?\" or \"What question do you have that I can help answer?\"      Will the hospital do an xray if you covid " positive?    Protocols used: INFORMATION ONLY CALL-ADULT-AH

## 2020-04-21 ENCOUNTER — READMISSION MANAGEMENT (OUTPATIENT)
Dept: CALL CENTER | Facility: HOSPITAL | Age: 70
End: 2020-04-21

## 2020-04-21 LAB — SARS-COV-2 RNA RESP QL NAA+PROBE: DETECTED

## 2020-04-21 NOTE — OUTREACH NOTE
COPD/PN Week 2 Survey      Responses   [facility] patient discharged from?  [facility]   COVID-19 Test Status  Confirmed   Does the patient have one of the following disease processes/diagnoses(primary or secondary)?  COPD/Pneumonia   Was the primary reason for admission:  Pneumonia   Week 2 attempt successful?  Yes   Call start time  1053   Call end time  1122   Discharge diagnosis  acute resp failure, PN, covid pos   Is patient permission given to speak with other caregiver?  Yes   List who call center can speak with  wife   Person spoke with today (if not patient) and relationship  wife   Meds reviewed with patient/caregiver?  Yes   Is the patient having any side effects they believe may be caused by any medication additions or changes?  No   Does the patient have all medications ordered at discharge?  Yes   Is the patient taking all medications as directed (includes completed medication regime)?  Yes   Medication comments  Using the inhaler.    Does the patient have a primary care provider?   Yes   Does the patient have an appointment with their PCP or pulmonologist within 7 days of discharge?  Yes   Comments regarding PCP  PCP:  Marlen Urbina MD- he has had a vurtial visit with PCP. He needs a repeat xray and they will obtain at the end of the week.    Has the patient kept scheduled appointments due by today?  Yes   Has home health visited the patient within 72 hours of discharge?  N/A   Psychosocial issues?  No   Comments  Patient is doing well, but he and wife are disappointed that he has another positive test for Covid-19. Wife reports he has never had a beard in his life, But has grown one just because he did not feel up to shaving. I recommended he shave and wash face at least 3 times day with antibacterial soap and water. Also recomended new toothbrush, chapstick and any tubings he may use on nebulizer.    Did the patient receive a copy of their discharge instructions?  Yes   Nursing interventions   Reviewed instructions with patient   What is the patient's perception of their health status since discharge?  Improving   Nursing Interventions  Nurse provided patient education   Are the patient's immunizations up to date?   Yes   Nursing interventions  Educated on importance of maintaining up to date immunizations as advised by provider   Is the patient/caregiver able to teach back the hierarchy of who to call/visit for symptoms/problems? PCP, Specialist, Home health nurse, Urgent Care, ED, 911  Yes   Additional teach back comments  Wife reports his 02 saturations are 96 % , he denies fever, SOB or other issues. Reassurance given over the disappointment second positive test.    Is the patient/caregiver able to teach back signs and symptoms of worsening condition:  Fever/chills, Shortness of breath, Chest pain   Is the patient/caregiver able to teach back importance of completing antibiotic course of treatment?  Yes   Week 2 call completed?  Yes          Bird Anaya RN

## 2020-04-24 ENCOUNTER — READMISSION MANAGEMENT (OUTPATIENT)
Dept: CALL CENTER | Facility: HOSPITAL | Age: 70
End: 2020-04-24

## 2020-04-24 NOTE — OUTREACH NOTE
COPD/PN Week 2 Survey      Responses   [facility] patient discharged from?  [facility]   COVID-19 Test Status  Confirmed   Does the patient have one of the following disease processes/diagnoses(primary or secondary)?  COPD/Pneumonia   Was the primary reason for admission:  Pneumonia   Week 2 attempt successful?  Yes   Call start time  1326   Call end time  1338   Meds reviewed with patient/caregiver?  Yes   Is the patient having any side effects they believe may be caused by any medication additions or changes?  No   Does the patient have all medications ordered at discharge?  Yes   Is the patient taking all medications as directed (includes completed medication regime)?  Yes   Does the patient have a primary care provider?   Yes   Does the patient have an appointment with their PCP or pulmonologist within 7 days of discharge?  Yes   Has the patient kept scheduled appointments due by today?  Yes   Comments  States will have repeat chest xray on 04/27/20.   Has home health visited the patient within 72 hours of discharge?  N/A   Psychosocial issues?  No   Comments  States is frustrated with no known end to quarantine-states will be checking with PCP.   Did the patient receive a copy of their discharge instructions?  Yes   Nursing interventions  Reviewed instructions with patient   What is the patient's perception of their health status since discharge?  Improving   Nursing Interventions  Nurse provided patient education   Is the patient/caregiver able to teach back the hierarchy of who to call/visit for symptoms/problems? PCP, Specialist, Home health nurse, Urgent Care, ED, 911  Yes   Is the patient/caregiver able to teach back signs and symptoms of worsening condition:  Fever/chills, Shortness of breath, Chest pain   Is the patient/caregiver able to teach back importance of completing antibiotic course of treatment?  Yes   Week 2 call completed?  Yes   Wrap up additional comments  States is feeling better-denies  any fever, chest pain, SOA, or cough. Denies complaints.          Purvi Madera RN

## 2020-04-27 ENCOUNTER — HOSPITAL ENCOUNTER (OUTPATIENT)
Dept: GENERAL RADIOLOGY | Facility: HOSPITAL | Age: 70
Discharge: HOME | End: 2020-04-27
Admitting: FAMILY MEDICINE

## 2020-04-27 ENCOUNTER — TRANSCRIBE ORDERS (OUTPATIENT)
Dept: ADMINISTRATIVE | Facility: HOSPITAL | Age: 70
End: 2020-04-27

## 2020-04-27 DIAGNOSIS — J12.82 PNEUMONIA DUE TO 2019 NOVEL CORONAVIRUS: Primary | ICD-10-CM

## 2020-04-27 DIAGNOSIS — J12.82 PNEUMONIA DUE TO 2019 NOVEL CORONAVIRUS: ICD-10-CM

## 2020-04-27 DIAGNOSIS — U07.1 PNEUMONIA DUE TO 2019 NOVEL CORONAVIRUS: ICD-10-CM

## 2020-04-27 DIAGNOSIS — U07.1 PNEUMONIA DUE TO 2019 NOVEL CORONAVIRUS: Primary | ICD-10-CM

## 2020-04-27 PROCEDURE — 71046 X-RAY EXAM CHEST 2 VIEWS: CPT

## 2020-05-01 ENCOUNTER — READMISSION MANAGEMENT (OUTPATIENT)
Dept: CALL CENTER | Facility: HOSPITAL | Age: 70
End: 2020-05-01

## 2020-05-01 NOTE — OUTREACH NOTE
COPD/PN Week 3 Survey      Responses   [facility] [place] patient discharged from?  [facility]   COVID-19 Test Status  Confirmed   Does the patient have one of the following disease processes/diagnoses(primary or secondary)?  COPD/Pneumonia   Was the primary reason for admission:  Pneumonia   Week 3 attempt successful?  Yes   Call start time  1337   Call end time  1346   Meds reviewed with patient/caregiver?  Yes   Is the patient having any side effects they believe may be caused by any medication additions or changes?  No   Does the patient have all medications ordered at discharge?  Yes   Is the patient taking all medications as directed (includes completed medication regime)?  Yes   Does the patient have a primary care provider?   Yes   Does the patient have an appointment with their PCP or pulmonologist within 7 days of discharge?  Yes   Has the patient kept scheduled appointments due by today?  Yes   Comments  Did chest xray-was negative. Had PCP appt today-another covid-19 test done-if comes back negative, will be released from quarantine.   Has home health visited the patient within 72 hours of discharge?  N/A   Psychosocial issues?  No   Did the patient receive a copy of their discharge instructions?  Yes   What is the patient's perception of their health status since discharge?  Improving   Is the patient/caregiver able to teach back the hierarchy of who to call/visit for symptoms/problems? PCP, Specialist, Home health nurse, Urgent Care, ED, 911  Yes   Additional teach back comments  States O2 levels are 96-97%, BS was 152 today. Denies any fever, SOA, cough, or chest pain.   Is the patient/caregiver able to teach back signs and symptoms of worsening condition:  Fever/chills, Shortness of breath, Chest pain   Is the patient/caregiver able to teach back importance of completing antibiotic course of treatment?  Yes   Week 3 call completed?  Yes   Wrap up additional comments  States continues to feel better-denies  any complaints. States waiting for repeat COVID-19 test results. No complaints today. States will be seeing cardiologist soon for arrhythmia found while in hospital.          Purvi Madera RN

## 2020-05-08 ENCOUNTER — READMISSION MANAGEMENT (OUTPATIENT)
Dept: CALL CENTER | Facility: HOSPITAL | Age: 70
End: 2020-05-08

## 2020-05-08 NOTE — OUTREACH NOTE
COPD/PN Week 4 Survey      Responses   [facility] patient discharged from?  [facility]   COVID-19 Test Status  Confirmed   Does the patient have one of the following disease processes/diagnoses(primary or secondary)?  COPD/Pneumonia   Was the primary reason for admission:  Pneumonia   Week 4 attempt successful?  Yes   Call start time  0944   Call end time  0956   Discharge diagnosis  acute resp failure, PN, covid pos   Person spoke with today (if not patient) and relationship  wife and    Meds reviewed with patient/caregiver?  Yes   Is the patient taking all medications as directed (includes completed medication regime)?  Yes   Has the patient kept scheduled appointments due by today?  Yes   Pulse Ox monitoring  Intermittent   Pulse Ox device source  Patient   Comments  Temp today 98.Pt is frustrated bc he is feeling better, no SOA or other symptoms but he was tested last week and still came up positive &will be tested again 5-12. Hoping to be neg so his wife can be tested, so she can have her oral surg she's been waiting for and he can return to normal life as his PCP still has him in quarantine due to +covid test result.    What is the patient's perception of their health status since discharge?  Returned to baseline/stable   Week 4 call completed?  Yes   Would the patient like one additional call?  No   Graduated  Yes   Was the number of calls appropriate?  Yes          Kristen Pham RN

## 2020-05-24 PROCEDURE — 93010 ELECTROCARDIOGRAM REPORT: CPT | Performed by: INTERNAL MEDICINE

## 2020-05-28 ENCOUNTER — OFFICE VISIT (OUTPATIENT)
Dept: CARDIOLOGY | Facility: CLINIC | Age: 70
End: 2020-05-28

## 2020-05-28 VITALS
HEART RATE: 77 BPM | WEIGHT: 206 LBS | OXYGEN SATURATION: 96 % | SYSTOLIC BLOOD PRESSURE: 116 MMHG | DIASTOLIC BLOOD PRESSURE: 70 MMHG | BODY MASS INDEX: 29.56 KG/M2

## 2020-05-28 DIAGNOSIS — E78.2 MIXED HYPERLIPIDEMIA: Chronic | ICD-10-CM

## 2020-05-28 DIAGNOSIS — I10 ESSENTIAL HYPERTENSION: ICD-10-CM

## 2020-05-28 DIAGNOSIS — I48.0 PAROXYSMAL ATRIAL FIBRILLATION (HCC): Primary | ICD-10-CM

## 2020-05-28 DIAGNOSIS — E11.9 TYPE 2 DIABETES MELLITUS WITHOUT COMPLICATION, WITHOUT LONG-TERM CURRENT USE OF INSULIN (HCC): ICD-10-CM

## 2020-05-28 PROCEDURE — 93000 ELECTROCARDIOGRAM COMPLETE: CPT | Performed by: INTERNAL MEDICINE

## 2020-05-28 PROCEDURE — 99204 OFFICE O/P NEW MOD 45 MIN: CPT | Performed by: INTERNAL MEDICINE

## 2020-05-28 RX ORDER — UBIDECARENONE 100 MG
100 CAPSULE ORAL DAILY
COMMUNITY

## 2020-05-28 RX ORDER — MULTIPLE VITAMINS W/ MINERALS TAB 9MG-400MCG
1 TAB ORAL DAILY
COMMUNITY

## 2020-05-28 RX ORDER — LORATADINE 10 MG/1
CAPSULE, LIQUID FILLED ORAL
COMMUNITY

## 2020-05-28 NOTE — PROGRESS NOTES
Encounter Date:05/28/2020  New patient      Patient ID: Tone Weber is a 70 y.o. male.    Chief Complaint:  New patient  History of atrial fibrillation  Hypertension  Dyslipidemia  History of positive COVID      History of Present Illness  The patient is a pleasant 70-year-old white male with history of diabetes and hypertension who was admitted to [facility] with COVID positive pneumonia and has improved significantly and was released home.  During the hospitalization patient had apparently atrial fibrillation which was asymptomatic.  Patient was seen by primary care and subsequently was advised cardiology evaluation and follow-up.  EKG was reviewed from the hospital that showed sinus rhythm.    The patient has been without any chest discomfort ,shortness of breath, palpitations, dizziness or syncope.  Denies having any headache ,abdominal pain ,nausea, vomiting , diarrhea constipation, loss of weight or loss of appetite.  Denies having any excessive bruising ,hematuria or blood in the stool.    Review of all systems negative except as indicated    Review of chronic problems  COVID positive pneumonia-improved.  Diabetes-on medications  Hypertension on medications  Dyslipidemia-on simvastatin    Assessment and Plan     ]]]]]]]]]]]]]]]]]]  Impression  =========  - Recent COVID positive pneumonia-improved.    - History of atrial fibrillation while patient had pneumonia.  Patient is in sinus rhythm now.    -Hypertension dyslipidemia diabetes    -Family history is positive for coronary artery disease.    -Former smoker    -No known allergies    -No significant surgical problems in the past.  =======  Plan  ======  EKG showed sinus rhythm nonspecific ST-T wave changes left anterior fascicular block 77/min no ectopy no change from 4/9/2020  Patient had atrial fibrillation while patient had pneumonia due to COVID-19  Patient is maintaining sinus rhythm now.  Patient is asymptomatic.  Patient did have an  echocardiogram.  Follow-up in the office on the same day.  Further plan will depend on patient's progress.  ]]]]]]]]]]]]]]]]]         Diagnosis Plan   1. Paroxysmal atrial fibrillation (CMS/HCC)  Adult Transthoracic Echo Complete W/ Cont if Necessary Per Protocol   2. Essential hypertension  Adult Transthoracic Echo Complete W/ Cont if Necessary Per Protocol   3. Mixed hyperlipidemia     4. Type 2 diabetes mellitus without complication, without long-term current use of insulin (CMS/HCC)     LAB RESULTS (LAST 7 DAYS)    CBC        BMP        CMP         BNP        TROPONIN        CoAg        Creatinine Clearance  CrCl cannot be calculated (Patient's most recent lab result is older than the maximum 30 days allowed.).    ABG        Radiology  No radiology results for the last day                The following portions of the patient's history were reviewed and updated as appropriate: allergies, current medications, past family history, past medical history, past social history, past surgical history and problem list.    Review of Systems   Constitution: Negative for fever and malaise/fatigue.   HENT: Negative for congestion and hearing loss.    Eyes: Negative for double vision and visual disturbance.   Cardiovascular: Negative for chest pain, claudication, dyspnea on exertion, leg swelling and syncope.   Respiratory: Negative for cough and shortness of breath.    Endocrine: Negative for cold intolerance.   Skin: Negative for color change and rash.   Musculoskeletal: Negative for arthritis and joint pain.   Gastrointestinal: Negative for abdominal pain and heartburn.   Genitourinary: Negative for hematuria.   Neurological: Negative for excessive daytime sleepiness and dizziness.   Psychiatric/Behavioral: Negative for depression. The patient is not nervous/anxious.    All other systems reviewed and are negative.        Current Outpatient Medications:   •  aspirin 81 MG chewable tablet, Chew 81 mg Every Other Day., Disp: ,  Rfl:   •  coenzyme Q10 100 MG capsule, Take 100 mg by mouth Daily., Disp: , Rfl:   •  ezetimibe (ZETIA) 10 MG tablet, Take 10 mg by mouth Daily., Disp: , Rfl: 1  •  ibuprofen (ADVIL,MOTRIN) 200 MG tablet, Take 200 mg by mouth Every 6 (Six) Hours As Needed for Mild Pain ., Disp: , Rfl:   •  lisinopril (PRINIVIL,ZESTRIL) 10 MG tablet, Take 10 mg by mouth Daily., Disp: , Rfl: 1  •  Loratadine 10 MG capsule, Take  by mouth., Disp: , Rfl:   •  metFORMIN (GLUCOPHAGE) 1000 MG tablet, Take 1,000 mg by mouth 2 (Two) Times a Day With Meals., Disp: , Rfl:   •  Multiple Vitamins-Minerals (MULTIVITAMIN WITH MINERALS) tablet tablet, Take 1 tablet by mouth Daily., Disp: , Rfl:   •  primidone (MYSOLINE) 50 MG tablet, TAKE 1 TABLET BY MOUTH AT BEDTIME. MAY INCREASE TO 2 TABLET AT BEDTIME AFTER 2 WEEKS, Disp: 60 tablet, Rfl: 11  •  simvastatin (ZOCOR) 40 MG tablet, Take 40 mg by mouth Daily., Disp: , Rfl: 2  •  albuterol sulfate  (90 Base) MCG/ACT inhaler, Inhale 2 puffs 4 (Four) Times a Day., Disp: 2 inhaler, Rfl: 0  •  benzonatate (Tessalon Perles) 100 MG capsule, Take 2 capsules by mouth 3 (Three) Times a Day As Needed for Cough., Disp: 30 capsule, Rfl: 0  •  HYDROcodone-homatropine (HYCODAN) 5-1.5 MG/5ML syrup, Take 5 mL by mouth Every 6 (Six) Hours As Needed for Cough., Disp: 120 mL, Rfl: 0  •  ondansetron (ZOFRAN) 4 MG tablet, Take 1 tablet by mouth Every 6 (Six) Hours As Needed for Nausea or Vomiting., Disp: 20 tablet, Rfl: 0    No Known Allergies    Family History   Problem Relation Age of Onset   • Heart disease Father    • Diabetes Father    • Parkinsonism Father        History reviewed. No pertinent surgical history.    Past Medical History:   Diagnosis Date   • Atrial fibrillation (CMS/HCC)    • Diabetes (CMS/HCC)    • Dysphonia of organic tremor    • HTN (hypertension)    • Hyperlipidemia    • Hypertension    • Macrocytosis    • Renal insufficiency        Family History   Problem Relation Age of Onset   • Heart  disease Father    • Diabetes Father    • Parkinsonism Father        Social History     Socioeconomic History   • Marital status:      Spouse name: Not on file   • Number of children: Not on file   • Years of education: Not on file   • Highest education level: Not on file   Tobacco Use   • Smoking status: Former Smoker   • Smokeless tobacco: Never Used   Substance and Sexual Activity   • Alcohol use: No     Frequency: Never   • Drug use: No           ECG 12 Lead  Date/Time: 5/28/2020 4:45 PM  Performed by: Willi Frederick MD  Authorized by: Willi Frederick MD   Comparison: compared with previous ECG   Similar to previous ECG  Comments: Normal sinus rhythm nonspecific ST-T wave changes left anterior fascicular block no ectopy 77/min no significant change from 4/9/2020              Objective:       Physical Exam    /70   Pulse 77   Wt 93.4 kg (206 lb)   SpO2 96%   BMI 29.56 kg/m²   The patient is alert, oriented and in no distress.    Vital signs as noted above.    Head and neck revealed no carotid bruits or jugular venous distension.  No thyromegaly or lymphadenopathy is present.    Lungs clear.  No wheezing.  Breath sounds are normal bilaterally.    Heart normal first and second heart sounds.  No murmur..  No pericardial rub is present.  No gallop is present.    Abdomen soft and nontender.  No organomegaly is present.    Extremities revealed good peripheral pulses without any pedal edema.    Skin warm and dry.    Musculoskeletal system is grossly normal.    CNS grossly normal.

## 2020-06-16 ENCOUNTER — OFFICE VISIT (OUTPATIENT)
Dept: CARDIOLOGY | Facility: CLINIC | Age: 70
End: 2020-06-16

## 2020-06-16 ENCOUNTER — HOSPITAL ENCOUNTER (OUTPATIENT)
Dept: CARDIOLOGY | Facility: HOSPITAL | Age: 70
Discharge: HOME | End: 2020-06-16
Admitting: INTERNAL MEDICINE

## 2020-06-16 VITALS
SYSTOLIC BLOOD PRESSURE: 124 MMHG | HEART RATE: 75 BPM | WEIGHT: 208 LBS | DIASTOLIC BLOOD PRESSURE: 74 MMHG | BODY MASS INDEX: 33.43 KG/M2 | HEIGHT: 66 IN | OXYGEN SATURATION: 96 %

## 2020-06-16 VITALS
BODY MASS INDEX: 33.43 KG/M2 | DIASTOLIC BLOOD PRESSURE: 74 MMHG | HEIGHT: 66 IN | SYSTOLIC BLOOD PRESSURE: 124 MMHG | WEIGHT: 208 LBS

## 2020-06-16 DIAGNOSIS — I10 ESSENTIAL HYPERTENSION: ICD-10-CM

## 2020-06-16 DIAGNOSIS — E78.2 MIXED HYPERLIPIDEMIA: ICD-10-CM

## 2020-06-16 DIAGNOSIS — I48.0 PAROXYSMAL ATRIAL FIBRILLATION (HCC): ICD-10-CM

## 2020-06-16 DIAGNOSIS — R06.02 SHORTNESS OF BREATH: Primary | ICD-10-CM

## 2020-06-16 DIAGNOSIS — E11.9 TYPE 2 DIABETES MELLITUS WITHOUT COMPLICATION, WITHOUT LONG-TERM CURRENT USE OF INSULIN (HCC): ICD-10-CM

## 2020-06-16 LAB
BH CV ECHO MEAS - AO MAX PG (FULL): 1.5 MMHG
BH CV ECHO MEAS - AO MAX PG: 6.8 MMHG
BH CV ECHO MEAS - AO MEAN PG (FULL): 1.1 MMHG
BH CV ECHO MEAS - AO MEAN PG: 3.8 MMHG
BH CV ECHO MEAS - AO ROOT AREA: 6.7 CM^2
BH CV ECHO MEAS - AO ROOT DIAM: 2.9 CM
BH CV ECHO MEAS - AO V2 MAX: 130.1 CM/SEC
BH CV ECHO MEAS - AO V2 MEAN: 92.3 CM/SEC
BH CV ECHO MEAS - AO V2 VTI: 25.6 CM
BH CV ECHO MEAS - AVA(I,A): 2.5 CM^2
BH CV ECHO MEAS - AVA(I,D): 2.5 CM^2
BH CV ECHO MEAS - AVA(V,A): 2.4 CM^2
BH CV ECHO MEAS - AVA(V,D): 2.4 CM^2
BH CV ECHO MEAS - EDV(CUBED): 58.6 ML
BH CV ECHO MEAS - EDV(MOD-SP4): 47 ML
BH CV ECHO MEAS - EDV(TEICH): 65.3 ML
BH CV ECHO MEAS - EF(CUBED): 82.5 %
BH CV ECHO MEAS - EF(MOD-SP4): 65.4 %
BH CV ECHO MEAS - EF(TEICH): 76 %
BH CV ECHO MEAS - ESV(CUBED): 10.2 ML
BH CV ECHO MEAS - ESV(MOD-SP4): 16.3 ML
BH CV ECHO MEAS - ESV(TEICH): 15.7 ML
BH CV ECHO MEAS - FS: 44.1 %
BH CV ECHO MEAS - IVS/LVPW: 0.8
BH CV ECHO MEAS - IVSD: 0.51 CM
BH CV ECHO MEAS - LA DIMENSION: 4.7 CM
BH CV ECHO MEAS - LA/AO: 1.6
BH CV ECHO MEAS - LV MASS(C)D: 58.6 GRAMS
BH CV ECHO MEAS - LV MAX PG: 5.2 MMHG
BH CV ECHO MEAS - LV MEAN PG: 2.7 MMHG
BH CV ECHO MEAS - LV V1 MAX: 114.3 CM/SEC
BH CV ECHO MEAS - LV V1 MEAN: 79 CM/SEC
BH CV ECHO MEAS - LV V1 VTI: 23.5 CM
BH CV ECHO MEAS - LVIDD: 3.9 CM
BH CV ECHO MEAS - LVIDS: 2.2 CM
BH CV ECHO MEAS - LVOT AREA: 2.7 CM^2
BH CV ECHO MEAS - LVOT DIAM: 1.9 CM
BH CV ECHO MEAS - LVPWD: 0.65 CM
BH CV ECHO MEAS - MV A MAX VEL: 114.9 CM/SEC
BH CV ECHO MEAS - MV DEC SLOPE: 327.3 CM/SEC^2
BH CV ECHO MEAS - MV DEC TIME: 0.29 SEC
BH CV ECHO MEAS - MV E MAX VEL: 94.3 CM/SEC
BH CV ECHO MEAS - MV E/A: 0.82
BH CV ECHO MEAS - MV MAX PG: 5.2 MMHG
BH CV ECHO MEAS - MV MEAN PG: 3 MMHG
BH CV ECHO MEAS - MV V2 MAX: 114 CM/SEC
BH CV ECHO MEAS - MV V2 MEAN: 84.8 CM/SEC
BH CV ECHO MEAS - MV V2 VTI: 27.6 CM
BH CV ECHO MEAS - MVA(VTI): 2.3 CM^2
BH CV ECHO MEAS - PA MAX PG: 3.5 MMHG
BH CV ECHO MEAS - PA V2 MAX: 93 CM/SEC
BH CV ECHO MEAS - SV(AO): 170.6 ML
BH CV ECHO MEAS - SV(CUBED): 48.3 ML
BH CV ECHO MEAS - SV(LVOT): 63.5 ML
BH CV ECHO MEAS - SV(MOD-SP4): 30.8 ML
BH CV ECHO MEAS - SV(TEICH): 49.6 ML
LV EF 2D ECHO EST: 60 %
MAXIMAL PREDICTED HEART RATE: 149 BPM
STRESS TARGET HR: 127 BPM

## 2020-06-16 PROCEDURE — 99214 OFFICE O/P EST MOD 30 MIN: CPT | Performed by: INTERNAL MEDICINE

## 2020-06-16 PROCEDURE — 93306 TTE W/DOPPLER COMPLETE: CPT | Performed by: INTERNAL MEDICINE

## 2020-06-16 PROCEDURE — 93306 TTE W/DOPPLER COMPLETE: CPT

## 2020-06-16 NOTE — PROGRESS NOTES
Encounter Date:06/16/2020      Patient ID: Tone Weber is a 70 y.o. male.    Chief Complaint:  History of atrial fibrillation  Hypertension  Dyslipidemia  History of positive COVID        History of Present Illness  The patient is a pleasant 70-year-old white male with history of diabetes and hypertension who was admitted to [facility], [place] with COVID positive pneumonia and has improved significantly and was released home.  During the hospitalization patient had apparently atrial fibrillation which was asymptomatic.  Patient was seen by primary care and subsequently was advised cardiology evaluation and follow-up.  EKG was reviewed from the hospital that showed sinus rhythm.    Patient is having shortness of breath with exertion    The patient has been without any chest discomfort, palpitations, dizziness or syncope.  Denies having any headache ,abdominal pain ,nausea, vomiting , diarrhea constipation, loss of weight or loss of appetite.  Denies having any excessive bruising ,hematuria or blood in the stool.     Review of all systems negative except as indicated     Review of chronic problems  COVID positive pneumonia-improved.  Diabetes-on medications  Hypertension on medications  Dyslipidemia-on simvastatin     Assessment and Plan      ]]]]]]]]]]]]]]]]]]  Impression  =========  -Shortness of breath with exertion  Echocardiogram 6/16/2020-normal    - Recent COVID positive pneumonia-improved.     - History of atrial fibrillation while patient had pneumonia.  Patient is in sinus rhythm now.     -Hypertension dyslipidemia diabetes     -Family history is positive for coronary artery disease.     -Former smoker     -No known allergies     -No significant surgical problems in the past.  =======  Plan  Recent shortness of breath with exertion.  Echocardiogram today is normal  Recent EKG showed sinus rhythm nonspecific ST-T wave changes left anterior fascicular block 77/min no ectopy no change from 4/9/2020  Patient  had atrial fibrillation while patient had pneumonia due to COVID-19  Patient is maintaining sinus rhythm now.  Stress Cardiolite test  Follow-up in the office on the same day.  Further plan will depend on patient's progress.  ]]]]]]]]]]]]]]]]]                   Diagnosis Plan   1. Shortness of breath  Stress Test With Myocardial Perfusion One Day   2. Essential hypertension     3. Type 2 diabetes mellitus without complication, without long-term current use of insulin (CMS/[facility])     4. Paroxysmal atrial fibrillation (CMS/HCC)     5. Mixed hyperlipidemia     LAB RESULTS (LAST 7 DAYS)    CBC        BMP        CMP         BNP        TROPONIN        CoAg        Creatinine Clearance  CrCl cannot be calculated (Patient's most recent lab result is older than the maximum 30 days allowed.).    ABG        Radiology  No radiology results for the last day                The following portions of the patient's history were reviewed and updated as appropriate: allergies, current medications, past family history, past medical history, past social history, past surgical history and problem list.    Review of Systems   Constitution: Negative for malaise/fatigue.   Cardiovascular: Negative for chest pain, leg swelling, palpitations and syncope.   Respiratory: Positive for shortness of breath (with exertion).    Skin: Negative for rash.   Gastrointestinal: Negative for nausea and vomiting.   Neurological: Negative for dizziness, light-headedness and numbness.         Current Outpatient Medications:   •  albuterol sulfate  (90 Base) MCG/ACT inhaler, Inhale 2 puffs 4 (Four) Times a Day., Disp: 2 inhaler, Rfl: 0  •  aspirin 81 MG chewable tablet, Chew 81 mg Every Other Day., Disp: , Rfl:   •  benzonatate (Tessalon Perles) 100 MG capsule, Take 2 capsules by mouth 3 (Three) Times a Day As Needed for Cough., Disp: 30 capsule, Rfl: 0  •  coenzyme Q10 100 MG capsule, Take 100 mg by mouth Daily., Disp: , Rfl:   •  ezetimibe (ZETIA) 10 MG  tablet, Take 10 mg by mouth Daily., Disp: , Rfl: 1  •  HYDROcodone-homatropine (HYCODAN) 5-1.5 MG/5ML syrup, Take 5 mL by mouth Every 6 (Six) Hours As Needed for Cough., Disp: 120 mL, Rfl: 0  •  ibuprofen (ADVIL,MOTRIN) 200 MG tablet, Take 200 mg by mouth Every 6 (Six) Hours As Needed for Mild Pain ., Disp: , Rfl:   •  lisinopril (PRINIVIL,ZESTRIL) 10 MG tablet, Take 10 mg by mouth Daily., Disp: , Rfl: 1  •  Loratadine 10 MG capsule, Take  by mouth., Disp: , Rfl:   •  metFORMIN (GLUCOPHAGE) 1000 MG tablet, Take 1,000 mg by mouth 2 (Two) Times a Day With Meals., Disp: , Rfl:   •  Multiple Vitamins-Minerals (MULTIVITAMIN WITH MINERALS) tablet tablet, Take 1 tablet by mouth Daily., Disp: , Rfl:   •  ondansetron (ZOFRAN) 4 MG tablet, Take 1 tablet by mouth Every 6 (Six) Hours As Needed for Nausea or Vomiting., Disp: 20 tablet, Rfl: 0  •  primidone (MYSOLINE) 50 MG tablet, TAKE 1 TABLET BY MOUTH AT BEDTIME. MAY INCREASE TO 2 TABLET AT BEDTIME AFTER 2 WEEKS, Disp: 60 tablet, Rfl: 11  •  simvastatin (ZOCOR) 40 MG tablet, Take 40 mg by mouth Daily., Disp: , Rfl: 2    No Known Allergies    Family History   Problem Relation Age of Onset   • Heart disease Father    • Diabetes Father    • Parkinsonism Father        History reviewed. No pertinent surgical history.    Past Medical History:   Diagnosis Date   • Atrial fibrillation (CMS/HCC)    • Diabetes (CMS/HCC)    • Dysphonia of organic tremor    • HTN (hypertension)    • Hyperlipidemia    • Hypertension    • Macrocytosis    • Renal insufficiency        Family History   Problem Relation Age of Onset   • Heart disease Father    • Diabetes Father    • Parkinsonism Father        Social History     Socioeconomic History   • Marital status:      Spouse name: Not on file   • Number of children: Not on file   • Years of education: Not on file   • Highest education level: Not on file   Tobacco Use   • Smoking status: Former Smoker   • Smokeless tobacco: Never Used   Substance  "and Sexual Activity   • Alcohol use: No     Frequency: Never   • Drug use: No         Procedures      Objective:       Physical Exam    /74 (BP Location: Left arm, Patient Position: Sitting, Cuff Size: Adult)   Pulse 75   Ht 167.6 cm (66\")   Wt 94.3 kg (208 lb)   SpO2 96%   BMI 33.57 kg/m²   The patient is alert, oriented and in no distress.    Vital signs as noted above.    Head and neck revealed no carotid bruits or jugular venous distension.  No thyromegaly or lymphadenopathy is present.    Lungs clear.  No wheezing.  Breath sounds are normal bilaterally.    Heart normal first and second heart sounds.  No murmur..  No pericardial rub is present.  No gallop is present.    Abdomen soft and nontender.  No organomegaly is present.    Extremities revealed good peripheral pulses without any pedal edema.    Skin warm and dry.    Musculoskeletal system is grossly normal.    CNS grossly normal.        "

## 2020-07-07 ENCOUNTER — HOSPITAL ENCOUNTER (OUTPATIENT)
Dept: CARDIOLOGY | Facility: HOSPITAL | Age: 70
Discharge: HOME | End: 2020-07-07

## 2020-07-07 ENCOUNTER — OFFICE VISIT (OUTPATIENT)
Dept: CARDIOLOGY | Facility: CLINIC | Age: 70
End: 2020-07-07

## 2020-07-07 VITALS
WEIGHT: 208 LBS | HEIGHT: 66 IN | DIASTOLIC BLOOD PRESSURE: 80 MMHG | HEART RATE: 77 BPM | BODY MASS INDEX: 33.43 KG/M2 | SYSTOLIC BLOOD PRESSURE: 144 MMHG

## 2020-07-07 DIAGNOSIS — R06.02 SHORTNESS OF BREATH: Primary | ICD-10-CM

## 2020-07-07 DIAGNOSIS — I10 ESSENTIAL HYPERTENSION: ICD-10-CM

## 2020-07-07 DIAGNOSIS — E11.9 TYPE 2 DIABETES MELLITUS WITHOUT COMPLICATION, WITHOUT LONG-TERM CURRENT USE OF INSULIN (HCC): ICD-10-CM

## 2020-07-07 DIAGNOSIS — R06.02 SHORTNESS OF BREATH: ICD-10-CM

## 2020-07-07 DIAGNOSIS — I48.0 PAROXYSMAL ATRIAL FIBRILLATION (HCC): ICD-10-CM

## 2020-07-07 DIAGNOSIS — E78.2 MIXED HYPERLIPIDEMIA: ICD-10-CM

## 2020-07-07 LAB
BH CV STRESS COMMENTS STAGE 1: NORMAL
BH CV STRESS DOSE REGADENOSON STAGE 1: 0.4
BH CV STRESS DURATION MIN STAGE 1: 0
BH CV STRESS DURATION SEC STAGE 1: 10
BH CV STRESS PROTOCOL 1: NORMAL
BH CV STRESS RECOVERY BP: NORMAL MMHG
BH CV STRESS RECOVERY HR: 98 BPM
BH CV STRESS STAGE 1: 1
MAXIMAL PREDICTED HEART RATE: 149 BPM
STRESS BASELINE BP: NORMAL MMHG
STRESS BASELINE HR: 75 BPM
STRESS TARGET HR: 127 BPM

## 2020-07-07 PROCEDURE — 25010000002 REGADENOSON 0.4 MG/5ML SOLUTION: Performed by: INTERNAL MEDICINE

## 2020-07-07 PROCEDURE — A9500 TC99M SESTAMIBI: HCPCS | Performed by: INTERNAL MEDICINE

## 2020-07-07 PROCEDURE — 0 TECHNETIUM SESTAMIBI: Performed by: INTERNAL MEDICINE

## 2020-07-07 PROCEDURE — 99213 OFFICE O/P EST LOW 20 MIN: CPT | Performed by: INTERNAL MEDICINE

## 2020-07-07 PROCEDURE — 93018 CV STRESS TEST I&R ONLY: CPT | Performed by: INTERNAL MEDICINE

## 2020-07-07 PROCEDURE — 78452 HT MUSCLE IMAGE SPECT MULT: CPT | Performed by: INTERNAL MEDICINE

## 2020-07-07 PROCEDURE — 93016 CV STRESS TEST SUPVJ ONLY: CPT | Performed by: INTERNAL MEDICINE

## 2020-07-07 PROCEDURE — 78452 HT MUSCLE IMAGE SPECT MULT: CPT

## 2020-07-07 PROCEDURE — 93017 CV STRESS TEST TRACING ONLY: CPT

## 2020-07-07 RX ADMIN — TECHNETIUM TC 99M SESTAMIBI 1 DOSE: 1 INJECTION INTRAVENOUS at 09:30

## 2020-07-07 RX ADMIN — REGADENOSON 0.4 MG: 0.08 INJECTION, SOLUTION INTRAVENOUS at 11:00

## 2020-07-07 NOTE — PROGRESS NOTES
Encounter Date:07/07/2020      Patient ID: Tone Weber is a 70 y.o. male.    Last seen 6/16/2020    Chief Complaint:    History of atrial fibrillation  Hypertension  Dyslipidemia  History of positive COVID        History of Present Illness  Patient recently was seen with the following history    The patient is a pleasant 70-year-old white male with history of diabetes and hypertension who was admitted to [facility] with COVID positive pneumonia and has improved significantly and was released home.  During the hospitalization patient had apparently atrial fibrillation which was asymptomatic.  Patient was seen by primary care and subsequently was advised cardiology evaluation and follow-up.  EKG was reviewed from the hospital that showed sinus rhythm.     Patient is having shortness of breath with exertion     The patient has been without any chest discomfort, palpitations, dizziness or syncope.  Denies having any headache ,abdominal pain ,nausea, vomiting , diarrhea constipation, loss of weight or loss of appetite.  Denies having any excessive bruising ,hematuria or blood in the stool.     Review of all systems negative except as indicated     Review of chronic problems  COVID positive pneumonia-improved.  Diabetes-on medications  Hypertension on medications  Dyslipidemia-on simvastatin     Assessment and Plan      ]]]]]]]]]]]]]]]]]]  Impression  =========  -Shortness of breath with exertion  Echocardiogram 6/16/2020-normal  Lexiscan Cardiolite test-normal 7/7/2020     - Recent COVID positive pneumonia-improved.     - History of atrial fibrillation while patient had pneumonia.  Patient is in sinus rhythm now.     -Hypertension dyslipidemia diabetes     -Family history is positive for coronary artery disease.     -Former smoker     -No known allergies     -No significant surgical problems in the past.  =======  Plan  =======  Recent shortness of breath with exertion.  Echocardiogram today is  normal-6/16/2020  Recent EKG showed sinus rhythm nonspecific ST-T wave changes left anterior fascicular block 77/min no ectopy no change from 4/9/2020  Patient had atrial fibrillation while patient had pneumonia due to COVID-19  Patient is maintaining sinus rhythm now.  Stress Cardiolite test-normal 7/7/2020  Follow-up in the office 6 weeks.  Consideration for cardiac catheterization if patient has continued symptoms.  Further plan will depend on patient's progress.  ]]]]]]]]]]]]]]]]]                          Diagnosis Plan   1. Shortness of breath     2. Essential hypertension     3. Type 2 diabetes mellitus without complication, without long-term current use of insulin (CMS/HCC)     4. Mixed hyperlipidemia     5. Paroxysmal atrial fibrillation (CMS/HCC)     LAB RESULTS (LAST 7 DAYS)    CBC        BMP        CMP         BNP        TROPONIN        CoAg        Creatinine Clearance  CrCl cannot be calculated (Patient's most recent lab result is older than the maximum 30 days allowed.).    ABG        Radiology  No radiology results for the last day                The following portions of the patient's history were reviewed and updated as appropriate: allergies, current medications, past family history, past medical history, past social history, past surgical history and problem list.    Review of Systems   Constitution: Negative for malaise/fatigue.   Cardiovascular: Negative for chest pain, leg swelling, palpitations and syncope.   Respiratory: Negative for shortness of breath.    Skin: Negative for rash.   Gastrointestinal: Negative for nausea and vomiting.   Neurological: Negative for dizziness, light-headedness and numbness.         Current Outpatient Medications:   •  albuterol sulfate  (90 Base) MCG/ACT inhaler, Inhale 2 puffs 4 (Four) Times a Day., Disp: 2 inhaler, Rfl: 0  •  aspirin 81 MG chewable tablet, Chew 81 mg Every Other Day., Disp: , Rfl:   •  benzonatate (Tessalon Perles) 100 MG capsule, Take 2  capsules by mouth 3 (Three) Times a Day As Needed for Cough., Disp: 30 capsule, Rfl: 0  •  coenzyme Q10 100 MG capsule, Take 100 mg by mouth Daily., Disp: , Rfl:   •  ezetimibe (ZETIA) 10 MG tablet, Take 10 mg by mouth Daily., Disp: , Rfl: 1  •  HYDROcodone-homatropine (HYCODAN) 5-1.5 MG/5ML syrup, Take 5 mL by mouth Every 6 (Six) Hours As Needed for Cough., Disp: 120 mL, Rfl: 0  •  ibuprofen (ADVIL,MOTRIN) 200 MG tablet, Take 200 mg by mouth Every 6 (Six) Hours As Needed for Mild Pain ., Disp: , Rfl:   •  lisinopril (PRINIVIL,ZESTRIL) 10 MG tablet, Take 10 mg by mouth Daily., Disp: , Rfl: 1  •  Loratadine 10 MG capsule, Take  by mouth., Disp: , Rfl:   •  metFORMIN (GLUCOPHAGE) 1000 MG tablet, Take 1,000 mg by mouth 2 (Two) Times a Day With Meals., Disp: , Rfl:   •  Multiple Vitamins-Minerals (MULTIVITAMIN WITH MINERALS) tablet tablet, Take 1 tablet by mouth Daily., Disp: , Rfl:   •  ondansetron (ZOFRAN) 4 MG tablet, Take 1 tablet by mouth Every 6 (Six) Hours As Needed for Nausea or Vomiting., Disp: 20 tablet, Rfl: 0  •  primidone (MYSOLINE) 50 MG tablet, TAKE 1 TABLET BY MOUTH AT BEDTIME. MAY INCREASE TO 2 TABLET AT BEDTIME AFTER 2 WEEKS, Disp: 60 tablet, Rfl: 11  •  simvastatin (ZOCOR) 40 MG tablet, Take 40 mg by mouth Daily., Disp: , Rfl: 2  No current facility-administered medications for this visit.     Facility-Administered Medications Ordered in Other Visits:   •  [COMPLETED] regadenoson (LEXISCAN) injection 0.4 mg, 0.4 mg, Intravenous, Once in imaging, Willi Frederick MD, 0.4 mg at 07/07/20 1100    No Known Allergies    Family History   Problem Relation Age of Onset   • Heart disease Father    • Diabetes Father    • Parkinsonism Father        History reviewed. No pertinent surgical history.    Past Medical History:   Diagnosis Date   • Atrial fibrillation (CMS/HCC)    • Diabetes (CMS/HCC)    • Dysphonia of organic tremor    • HTN (hypertension)    • Hyperlipidemia    • Hypertension    • Macrocytosis    •  "Renal insufficiency        Family History   Problem Relation Age of Onset   • Heart disease Father    • Diabetes Father    • Parkinsonism Father        Social History     Socioeconomic History   • Marital status:      Spouse name: Not on file   • Number of children: Not on file   • Years of education: Not on file   • Highest education level: Not on file   Tobacco Use   • Smoking status: Former Smoker   • Smokeless tobacco: Never Used   Substance and Sexual Activity   • Alcohol use: No     Frequency: Never   • Drug use: No         Procedures      Objective:       Physical Exam    /80   Pulse 77   Ht 167.6 cm (66\")   Wt 94.3 kg (208 lb)   BMI 33.57 kg/m²   The patient is alert, oriented and in no distress.    Vital signs as noted above.    Head and neck revealed no carotid bruits or jugular venous distension.  No thyromegaly or lymphadenopathy is present.    Lungs clear.  No wheezing.  Breath sounds are normal bilaterally.    Heart normal first and second heart sounds.  No murmur..  No pericardial rub is present.  No gallop is present.    Abdomen soft and nontender.  No organomegaly is present.    Extremities revealed good peripheral pulses without any pedal edema.    Skin warm and dry.    Musculoskeletal system is grossly normal.    CNS grossly normal.        "

## 2020-08-21 RX ORDER — PRIMIDONE 50 MG/1
TABLET ORAL
Qty: 60 TABLET | Refills: 11 | Status: SHIPPED | OUTPATIENT
Start: 2020-08-21 | End: 2020-08-25 | Stop reason: SDUPTHER

## 2020-08-25 ENCOUNTER — OFFICE VISIT (OUTPATIENT)
Dept: NEUROLOGY | Facility: CLINIC | Age: 70
End: 2020-08-25

## 2020-08-25 VITALS
DIASTOLIC BLOOD PRESSURE: 75 MMHG | BODY MASS INDEX: 33.46 KG/M2 | HEIGHT: 66 IN | TEMPERATURE: 97.1 F | SYSTOLIC BLOOD PRESSURE: 153 MMHG | WEIGHT: 208.2 LBS | HEART RATE: 76 BPM

## 2020-08-25 DIAGNOSIS — G25.0 BENIGN ESSENTIAL TREMOR: Primary | ICD-10-CM

## 2020-08-25 PROBLEM — E11.9 TYPE 2 DIABETES MELLITUS WITHOUT COMPLICATION: Status: ACTIVE | Noted: 2017-02-22

## 2020-08-25 PROCEDURE — 99212 OFFICE O/P EST SF 10 MIN: CPT | Performed by: PSYCHIATRY & NEUROLOGY

## 2020-08-25 RX ORDER — PRIMIDONE 50 MG/1
50 TABLET ORAL NIGHTLY
Qty: 90 TABLET | Refills: 3 | Status: SHIPPED | OUTPATIENT
Start: 2020-08-25 | End: 2021-08-31 | Stop reason: SDUPTHER

## 2020-08-25 NOTE — PROGRESS NOTES
Subjective: Essential tremor    Patient ID: Tone Weber is a 70 y.o. male.    History of Present Illness, yearly f/u     Essential tremor, currently taking mysoline for the tremors.    Patient doing well with mysoline 50 mg 1 po at .   He had a great response to only one mysoline per day.  He is again able to use a screw .     No trouble with walking or balance or swallowing, no acting out dreams.    Patient had Covid-19 in March and has recovered.    ======================2019===============================  New patient referred by Dr. Urbina for tremors.    Patient states both hands shake started over 20 years ago.   Now getting worse.  Mostly an action tremor, such as while eating, driving.  Unable to use as small screw . Now notes a little resting tremor.   Per his wife his sense of smell is poor. (always has had a poor sense of smell)  No dream motor activity.  Balance is fine. No trouble walking . No chronic constipation.  No trouble swallowing, rolling over in bed or walking up or down stairs   He had aneurysm repair 29 years ago with a metal ferrous clip.  Had a ruptured aneurysm.  Father has Parkinson's.  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  Symptoms started in his mid to late 50's ,  age 69 from MI, was in wheel chair. His tremor was resting tremor. He also had a stroke. No one else with tremor.  pts sisters do not have a tremor. No tremors in grandparents.     ====================================================================================================    The following portions of the patient's history were reviewed and updated as appropriate: allergies, current medications, past family history, past medical history, past social history, past surgical history and problem list.    Family History   Problem Relation Age of Onset   • Heart disease Father    • Diabetes Father    • Parkinsonism Father        Past Medical History:   Diagnosis Date   • Atrial fibrillation  (CMS/[facility])    • Diabetes (CMS/[facility])    • Dysphonia of organic tremor    • HTN (hypertension)    • Hyperlipidemia    • Hypertension    • Macrocytosis    • Renal insufficiency        Social History     Socioeconomic History   • Marital status:      Spouse name: Not on file   • Number of children: Not on file   • Years of education: Not on file   • Highest education level: Not on file   Tobacco Use   • Smoking status: Former Smoker   • Smokeless tobacco: Never Used   Substance and Sexual Activity   • Alcohol use: No     Frequency: Never   • Drug use: No   • Sexual activity: Yes     Partners: Female         Current Outpatient Medications:   •  albuterol sulfate  (90 Base) MCG/ACT inhaler, Inhale 2 puffs 4 (Four) Times a Day., Disp: 2 inhaler, Rfl: 0  •  aspirin 81 MG chewable tablet, Chew 81 mg Every Other Day., Disp: , Rfl:   •  coenzyme Q10 100 MG capsule, Take 100 mg by mouth Daily., Disp: , Rfl:   •  ezetimibe (ZETIA) 10 MG tablet, Take 10 mg by mouth Daily., Disp: , Rfl: 1  •  ibuprofen (ADVIL,MOTRIN) 200 MG tablet, Take 200 mg by mouth Every 6 (Six) Hours As Needed for Mild Pain ., Disp: , Rfl:   •  lisinopril (PRINIVIL,ZESTRIL) 10 MG tablet, Take 10 mg by mouth Daily., Disp: , Rfl: 1  •  Loratadine 10 MG capsule, Take  by mouth., Disp: , Rfl:   •  metFORMIN (GLUCOPHAGE) 1000 MG tablet, Take 1,000 mg by mouth 2 (Two) Times a Day With Meals., Disp: , Rfl:   •  Multiple Vitamins-Minerals (MULTIVITAMIN WITH MINERALS) tablet tablet, Take 1 tablet by mouth Daily., Disp: , Rfl:   •  ondansetron (ZOFRAN) 4 MG tablet, Take 1 tablet by mouth Every 6 (Six) Hours As Needed for Nausea or Vomiting., Disp: 20 tablet, Rfl: 0  •  primidone (MYSOLINE) 50 MG tablet, Take 1 tablet by mouth Every Night., Disp: 90 tablet, Rfl: 3  •  simvastatin (ZOCOR) 40 MG tablet, Take 40 mg by mouth Daily., Disp: , Rfl: 2    Review of Systems   Constitutional: Negative for appetite change, fatigue and fever.   HENT: Negative for  postnasal drip and sinus pain.    Eyes: Negative for discharge and itching.   Respiratory: Negative for chest tightness and shortness of breath.    Cardiovascular: Negative for chest pain and leg swelling.   Gastrointestinal: Negative for constipation and diarrhea.   Endocrine: Negative for cold intolerance and heat intolerance.   Genitourinary: Negative for frequency and urgency.   Musculoskeletal: Negative for gait problem and joint swelling.   Neurological: Positive for tremors. Negative for syncope, light-headedness and headaches.   Psychiatric/Behavioral: Negative for behavioral problems, confusion and sleep disturbance.          I have reviewed ROS completed by medical assistant.     Objective:    Neurologic Exam     Mental Status   Oriented to person, place, and time.   Level of consciousness: alert    Cranial Nerves     CN III, IV, VI   Pupils are equal, round, and reactive to light.  Extraocular motions are normal.       Physical Exam   Constitutional: He is oriented to person, place, and time.   Eyes: Pupils are equal, round, and reactive to light. EOM are normal.   Neurological: He is alert and oriented to person, place, and time.   Minimal action no resting tremor  Normal gait   Psychiatric: He has a normal mood and affect.       Assessment/Plan:    Tone was seen today for tremors.    Diagnoses and all orders for this visit:    Benign essential tremor    Other orders  -     primidone (MYSOLINE) 50 MG tablet; Take 1 tablet by mouth Every Night.        Good response to mysoline continue current dose  Fu in one year    This document has been electronically signed by Joseph Seipel, MD on August 25, 2020 14:10

## 2020-08-31 ENCOUNTER — OFFICE VISIT (OUTPATIENT)
Dept: CARDIOLOGY | Facility: CLINIC | Age: 70
End: 2020-08-31

## 2020-08-31 VITALS
SYSTOLIC BLOOD PRESSURE: 143 MMHG | OXYGEN SATURATION: 99 % | HEART RATE: 79 BPM | BODY MASS INDEX: 33.59 KG/M2 | HEIGHT: 66 IN | DIASTOLIC BLOOD PRESSURE: 76 MMHG | WEIGHT: 209 LBS

## 2020-08-31 DIAGNOSIS — R06.02 SHORTNESS OF BREATH: Primary | ICD-10-CM

## 2020-08-31 DIAGNOSIS — E11.9 TYPE 2 DIABETES MELLITUS WITHOUT COMPLICATION, WITHOUT LONG-TERM CURRENT USE OF INSULIN (HCC): ICD-10-CM

## 2020-08-31 DIAGNOSIS — I48.0 PAROXYSMAL ATRIAL FIBRILLATION (HCC): ICD-10-CM

## 2020-08-31 DIAGNOSIS — E78.2 MIXED HYPERLIPIDEMIA: ICD-10-CM

## 2020-08-31 DIAGNOSIS — I10 ESSENTIAL HYPERTENSION: ICD-10-CM

## 2020-08-31 PROCEDURE — 99213 OFFICE O/P EST LOW 20 MIN: CPT | Performed by: INTERNAL MEDICINE

## 2020-08-31 NOTE — PROGRESS NOTES
Encounter Date:08/31/2020  Last seen 7/7/2020      Patient ID: Tone Weber is a 70 y.o. male.    Chief Complaint:  History of atrial fibrillation  Hypertension  Dyslipidemia  History of positive COVID March 2020     History of Present Illness  Patient recently was seen with the following history     The patient is a pleasant 70-year-old white male with history of diabetes and hypertension who was admitted to [facility] with COVID positive pneumonia and has improved significantly and was released home.  During the hospitalization patient had apparently atrial fibrillation which was asymptomatic.  Patient was seen by primary care and subsequently was advised cardiology evaluation and follow-up.  EKG was reviewed from the hospital that showed sinus rhythm.     Patient is having shortness of breath with exertion     The patient has been without any chest discomfort, palpitations, dizziness or syncope.  Denies having any headache ,abdominal pain ,nausea, vomiting , diarrhea constipation, loss of weight or loss of appetite.  Denies having any excessive bruising ,hematuria or blood in the stool.     Review of all systems negative except as indicated     Review of chronic problems  COVID positive pneumonia-improved.  Diabetes-on medications  Hypertension on medications  Dyslipidemia-on simvastatin     Assessment and Plan      ]]]]]]]]]]]]]]]]]]  Impression  =========  -Shortness of breath with exertion-improved  Echocardiogram 6/16/2020-normal  Lexiscan Cardiolite test-normal 7/7/2020     - History of COVID positive pneumonia-improved.  March 2020     - History of atrial fibrillation while patient had pneumonia.  Patient is in sinus rhythm now.     -Hypertension dyslipidemia diabetes     -Family history is positive for coronary artery disease.     -Former smoker     -No known allergies     -No significant surgical problems in the past.  =======  Plan  =======  Patient is not having any angina pectoris or  congestive heart failure.  Echocardiogram-normal-6/16/2020  Patient had atrial fibrillation while patient had pneumonia due to COVID-19  Patient is maintaining sinus rhythm now.  Stress Cardiolite test-normal 7/7/2020  Follow-up in the office 6 months  Consideration for cardiac catheterization if patient has continued symptoms in the future.  Further plan will depend on patient's progress.  ]]]]]]]]]]]]]]]]]                      Diagnosis Plan   1. Shortness of breath     2. Essential hypertension     3. Type 2 diabetes mellitus without complication, without long-term current use of insulin (CMS/[facility])     4. Paroxysmal atrial fibrillation (CMS/[facility])     5. Mixed hyperlipidemia     LAB RESULTS (LAST 7 DAYS)    CBC        BMP        CMP         BNP        TROPONIN        CoAg        Creatinine Clearance  CrCl cannot be calculated (Patient's most recent lab result is older than the maximum 30 days allowed.).    ABG        Radiology  No radiology results for the last day                The following portions of the patient's history were reviewed and updated as appropriate: allergies, current medications, past family history, past medical history, past social history, past surgical history and problem list.    Review of Systems   Constitution: Negative for malaise/fatigue.   Cardiovascular: Negative for chest pain, leg swelling, palpitations and syncope.   Respiratory: Negative for shortness of breath.    Skin: Negative for rash.   Gastrointestinal: Negative for nausea and vomiting.   Neurological: Negative for dizziness, light-headedness and numbness.         Current Outpatient Medications:   •  albuterol sulfate  (90 Base) MCG/ACT inhaler, Inhale 2 puffs 4 (Four) Times a Day., Disp: 2 inhaler, Rfl: 0  •  aspirin 81 MG chewable tablet, Chew 81 mg Every Other Day., Disp: , Rfl:   •  coenzyme Q10 100 MG capsule, Take 100 mg by mouth Daily., Disp: , Rfl:   •  ezetimibe (ZETIA) 10 MG tablet, Take 10 mg by mouth  Daily., Disp: , Rfl: 1  •  ibuprofen (ADVIL,MOTRIN) 200 MG tablet, Take 200 mg by mouth Every 6 (Six) Hours As Needed for Mild Pain ., Disp: , Rfl:   •  lisinopril (PRINIVIL,ZESTRIL) 10 MG tablet, Take 10 mg by mouth Daily., Disp: , Rfl: 1  •  Loratadine 10 MG capsule, Take  by mouth., Disp: , Rfl:   •  metFORMIN (GLUCOPHAGE) 1000 MG tablet, Take 1,000 mg by mouth 2 (Two) Times a Day With Meals., Disp: , Rfl:   •  Multiple Vitamins-Minerals (MULTIVITAMIN WITH MINERALS) tablet tablet, Take 1 tablet by mouth Daily., Disp: , Rfl:   •  ondansetron (ZOFRAN) 4 MG tablet, Take 1 tablet by mouth Every 6 (Six) Hours As Needed for Nausea or Vomiting., Disp: 20 tablet, Rfl: 0  •  primidone (MYSOLINE) 50 MG tablet, Take 1 tablet by mouth Every Night., Disp: 90 tablet, Rfl: 3  •  simvastatin (ZOCOR) 40 MG tablet, Take 40 mg by mouth Daily., Disp: , Rfl: 2    No Known Allergies    Family History   Problem Relation Age of Onset   • Heart disease Father    • Diabetes Father    • Parkinsonism Father        History reviewed. No pertinent surgical history.    Past Medical History:   Diagnosis Date   • Atrial fibrillation (CMS/HCC)    • Diabetes (CMS/HCC)    • Dysphonia of organic tremor    • HTN (hypertension)    • Hyperlipidemia    • Hypertension    • Macrocytosis    • Renal insufficiency        Family History   Problem Relation Age of Onset   • Heart disease Father    • Diabetes Father    • Parkinsonism Father        Social History     Socioeconomic History   • Marital status:      Spouse name: Not on file   • Number of children: Not on file   • Years of education: Not on file   • Highest education level: Not on file   Tobacco Use   • Smoking status: Former Smoker   • Smokeless tobacco: Never Used   Substance and Sexual Activity   • Alcohol use: No     Frequency: Never   • Drug use: No   • Sexual activity: Yes     Partners: Female         Procedures      Objective:       Physical Exam    /76   Pulse 79   Ht 167.6 cm  "(66\")   Wt 94.8 kg (209 lb)   SpO2 99%   BMI 33.73 kg/m²   The patient is alert, oriented and in no distress.    Vital signs as noted above.    Head and neck revealed no carotid bruits or jugular venous distension.  No thyromegaly or lymphadenopathy is present.    Lungs clear.  No wheezing.  Breath sounds are normal bilaterally.    Heart normal first and second heart sounds.  No murmur..  No pericardial rub is present.  No gallop is present.    Abdomen soft and nontender.  No organomegaly is present.    Extremities revealed good peripheral pulses without any pedal edema.    Skin warm and dry.    Musculoskeletal system is grossly normal.    CNS grossly normal.        "

## 2021-03-16 ENCOUNTER — OFFICE VISIT (OUTPATIENT)
Dept: CARDIOLOGY | Facility: CLINIC | Age: 71
End: 2021-03-16

## 2021-03-16 VITALS
HEIGHT: 66 IN | DIASTOLIC BLOOD PRESSURE: 84 MMHG | HEART RATE: 80 BPM | OXYGEN SATURATION: 99 % | BODY MASS INDEX: 34.07 KG/M2 | TEMPERATURE: 96.9 F | WEIGHT: 212 LBS | SYSTOLIC BLOOD PRESSURE: 132 MMHG

## 2021-03-16 DIAGNOSIS — I48.0 PAROXYSMAL ATRIAL FIBRILLATION (HCC): ICD-10-CM

## 2021-03-16 DIAGNOSIS — E78.2 MIXED HYPERLIPIDEMIA: ICD-10-CM

## 2021-03-16 DIAGNOSIS — R06.02 SHORTNESS OF BREATH: Primary | ICD-10-CM

## 2021-03-16 DIAGNOSIS — I10 ESSENTIAL HYPERTENSION: ICD-10-CM

## 2021-03-16 PROCEDURE — 93000 ELECTROCARDIOGRAM COMPLETE: CPT | Performed by: INTERNAL MEDICINE

## 2021-03-16 PROCEDURE — 99214 OFFICE O/P EST MOD 30 MIN: CPT | Performed by: INTERNAL MEDICINE

## 2021-03-16 NOTE — PROGRESS NOTES
Encounter Date:03/16/202  Last seen 8/31/2020      Patient ID: Tnoe Weber is a 70 y.o. male.    Chief Complaint:  History of atrial fibrillation  Hypertension  Dyslipidemia  History of positive COVID March 2020     History of Present Illness  Since I have last seen, the patient has been without any chest discomfort ,shortness of breath, palpitations, dizziness or syncope.  Denies having any headache ,abdominal pain ,nausea, vomiting , diarrhea constipation, loss of weight or loss of appetite.  Denies having any excessive bruising ,hematuria or blood in the stool.    Review of all systems negative except as indicated.    Reviewed ROS.    Assessment and Plan      ]]]]]]]]]]]]]]]]]]  Impression  =========  -Shortness of breath with exertion-improved  Echocardiogram 6/16/2020-normal  Lexiscan Cardiolite test-normal 7/7/2020     - History of COVID positive pneumonia-improved.  March 2020     - History of atrial fibrillation while patient had pneumonia.  Patient is in sinus rhythm now.     -Hypertension dyslipidemia diabetes     -Family history is positive for coronary artery disease.     -Former smoker     -No known allergies     -No significant surgical problems in the past.  =======  Plan  =======  History of shortness of breath-improved.  EKG showed sinus rhythm without any ischemic changes    History of atrial fibrillation-maintaining sinus rhythm.    Hypertension-well controlled 130/84    Dyslipidemia-continue simvastatin    Patient is not having any angina pectoris or congestive heart failure.    Medications were reviewed and updated.  Follow-up in the office in 6 months.  Further plan will depend on patient's progress.  ]]]]]]]]]]]]]]]]]               Diagnosis Plan   1. Shortness of breath     2. Essential hypertension     3. Mixed hyperlipidemia     4. Paroxysmal atrial fibrillation (CMS/HCC)     LAB RESULTS (LAST 7 DAYS)    CBC        BMP        CMP         BNP        TROPONIN        CoAg        Creatinine  Clearance  CrCl cannot be calculated (Patient's most recent lab result is older than the maximum 30 days allowed.).    ABG        Radiology  No radiology results for the last day                The following portions of the patient's history were reviewed and updated as appropriate: allergies, current medications, past family history, past medical history, past social history, past surgical history and problem list.    Review of Systems   Constitutional: Negative for malaise/fatigue.   Cardiovascular: Negative for chest pain, leg swelling, palpitations and syncope.   Respiratory: Negative for shortness of breath.    Skin: Negative for rash.   Gastrointestinal: Negative for nausea and vomiting.   Neurological: Negative for dizziness, light-headedness and numbness.         Current Outpatient Medications:   •  aspirin 81 MG chewable tablet, Chew 81 mg Every Other Day., Disp: , Rfl:   •  coenzyme Q10 100 MG capsule, Take 100 mg by mouth Daily., Disp: , Rfl:   •  ezetimibe (ZETIA) 10 MG tablet, Take 10 mg by mouth Daily., Disp: , Rfl: 1  •  ibuprofen (ADVIL,MOTRIN) 200 MG tablet, Take 200 mg by mouth Every 6 (Six) Hours As Needed for Mild Pain ., Disp: , Rfl:   •  lisinopril (PRINIVIL,ZESTRIL) 10 MG tablet, Take 10 mg by mouth Daily., Disp: , Rfl: 1  •  Loratadine 10 MG capsule, Take  by mouth., Disp: , Rfl:   •  metFORMIN (GLUCOPHAGE) 1000 MG tablet, Take 1,000 mg by mouth 2 (Two) Times a Day With Meals., Disp: , Rfl:   •  Multiple Vitamins-Minerals (MULTIVITAMIN WITH MINERALS) tablet tablet, Take 1 tablet by mouth Daily., Disp: , Rfl:   •  primidone (MYSOLINE) 50 MG tablet, Take 1 tablet by mouth Every Night., Disp: 90 tablet, Rfl: 3  •  simvastatin (ZOCOR) 40 MG tablet, Take 40 mg by mouth Daily., Disp: , Rfl: 2    No Known Allergies    Family History   Problem Relation Age of Onset   • Heart disease Father    • Diabetes Father    • Parkinsonism Father        No past surgical history on file.    Past Medical History:  "  Diagnosis Date   • Atrial fibrillation (CMS/HCC)    • Diabetes (CMS/HCC)    • Dysphonia of organic tremor    • HTN (hypertension)    • Hyperlipidemia    • Hypertension    • Macrocytosis    • Renal insufficiency        Family History   Problem Relation Age of Onset   • Heart disease Father    • Diabetes Father    • Parkinsonism Father        Social History     Socioeconomic History   • Marital status:      Spouse name: Not on file   • Number of children: Not on file   • Years of education: Not on file   • Highest education level: Not on file   Tobacco Use   • Smoking status: Former Smoker   • Smokeless tobacco: Never Used   Vaping Use   • Vaping Use: Never used   Substance and Sexual Activity   • Alcohol use: No   • Drug use: No   • Sexual activity: Yes     Partners: Female           ECG 12 Lead    Date/Time: 3/16/2021 1:32 PM  Performed by: Willi Frederick MD  Authorized by: Willi Frederick MD   Comparison: compared with previous ECG   Similar to previous ECG  Comparison to previous ECG: Normal sinus rhythm nonspecific ST-T wave changes 74/min normal axis normal intervals no ectopy no significant change from 5/28/2020                Objective:       Physical Exam    /84 (BP Location: Left arm, Patient Position: Sitting, Cuff Size: Large Adult)   Pulse 80   Temp 96.9 °F (36.1 °C)   Ht 167.6 cm (66\")   Wt 96.2 kg (212 lb)   SpO2 99%   BMI 34.22 kg/m²   The patient is alert, oriented and in no distress.    Vital signs as noted above.    Head and neck revealed no carotid bruits or jugular venous distension.  No thyromegaly or lymphadenopathy is present.    Lungs clear.  No wheezing.  Breath sounds are normal bilaterally.    Heart normal first and second heart sounds.  No murmur..  No pericardial rub is present.  No gallop is present.    Abdomen soft and nontender.  No organomegaly is present.    Extremities revealed good peripheral pulses without any pedal edema.    Skin warm and " dry.    Musculoskeletal system is grossly normal.    CNS grossly normal.    Reviewed and unchanged from last visit.

## 2021-08-27 NOTE — PROGRESS NOTES
Chief Complaint  Tremors    Subjective          Tone MOHSEN Weber presents to [facility] for tremors  History of Present Illness   Patient is here for follow up on tremors in bilateral hands he states tremors are better,he states medication seems to help a lot and is allowing him to work with his hands again he is currently taking mysoline 50mg 1 qhs    Tremor gradually worsened over the years. No tremor of head, no trouble walking            ========================================================================  Previous OV:8-  Essential tremor, currently taking mysoline for the tremors.    Patient doing well with mysoline 50 mg 1 po at hs.   He had a great response to only one mysoline per day.  He is again able to use a screw .      No trouble with walking or balance or swallowing, no acting out dreams.    Current Outpatient Medications:   •  aspirin 81 MG chewable tablet, Chew 81 mg Every Other Day., Disp: , Rfl:   •  coenzyme Q10 100 MG capsule, Take 100 mg by mouth Daily., Disp: , Rfl:   •  ezetimibe (ZETIA) 10 MG tablet, Take 10 mg by mouth Daily., Disp: , Rfl: 1  •  ibuprofen (ADVIL,MOTRIN) 200 MG tablet, Take 200 mg by mouth Every 6 (Six) Hours As Needed for Mild Pain ., Disp: , Rfl:   •  lisinopril (PRINIVIL,ZESTRIL) 10 MG tablet, Take 10 mg by mouth Daily., Disp: , Rfl: 1  •  Loratadine 10 MG capsule, Take  by mouth., Disp: , Rfl:   •  metFORMIN (GLUCOPHAGE) 1000 MG tablet, Take 1,000 mg by mouth 2 (Two) Times a Day With Meals., Disp: , Rfl:   •  Multiple Vitamins-Minerals (MULTIVITAMIN WITH MINERALS) tablet tablet, Take 1 tablet by mouth Daily., Disp: , Rfl:   •  primidone (MYSOLINE) 50 MG tablet, Take 1 tablet by mouth Every Night., Disp: 90 tablet, Rfl: 3  •  simvastatin (ZOCOR) 40 MG tablet, Take 40 mg by mouth Daily., Disp: , Rfl: 2    Review of Systems   Constitutional: Negative for chills and fever.   HENT: Negative for drooling and tinnitus.    Eyes: Negative  "for photophobia.   Respiratory: Negative for cough and shortness of breath.    Cardiovascular: Negative for leg swelling.   Gastrointestinal: Negative for constipation and nausea.   Endocrine: Negative for cold intolerance and heat intolerance.   Genitourinary: Negative for frequency and urgency.   Musculoskeletal: Negative for joint swelling.   Neurological: Negative for dizziness and headaches.   Psychiatric/Behavioral: Negative for confusion and hallucinations.          Objective:    Vital Signs:   /76   Pulse 73   Temp 97.7 °F (36.5 °C) (Temporal)   Ht 167.6 cm (66\")   Wt 96.2 kg (212 lb)   BMI 34.22 kg/m²     Physical Exam  Vitals reviewed.   Constitutional:       Appearance: Normal appearance.   Neurological:      General: No focal deficit present.      Mental Status: He is alert and oriented to person, place, and time.      Comments: Minimal tremor of out stretched arms   Psychiatric:         Mood and Affect: Mood normal.        Result Review :                Neurologic Exam     Mental Status   Oriented to person, place, and time.         Assessment and Plan    Diagnoses and all orders for this visit:    1. Benign essential tremor (Primary)    2. Dysphonia of organic tremor    Other orders  -     primidone (MYSOLINE) 50 MG tablet; Take 1 tablet by mouth Every Night.  Dispense: 90 tablet; Refill: 3         Follow Up   Return in about 1 year (around 8/31/2022).  Patient was given instructions and counseling regarding his condition or for health maintenance advice. Please see specific information pulled into the AVS if appropriate.     This document has been electronically signed by Joseph Seipel, MD on August 31, 2021 13:44 EDT      "

## 2021-08-31 ENCOUNTER — OFFICE VISIT (OUTPATIENT)
Dept: NEUROLOGY | Facility: CLINIC | Age: 71
End: 2021-08-31

## 2021-08-31 VITALS
BODY MASS INDEX: 34.07 KG/M2 | HEIGHT: 66 IN | HEART RATE: 73 BPM | TEMPERATURE: 97.7 F | DIASTOLIC BLOOD PRESSURE: 76 MMHG | WEIGHT: 212 LBS | SYSTOLIC BLOOD PRESSURE: 143 MMHG

## 2021-08-31 DIAGNOSIS — R25.1 DYSPHONIA OF ORGANIC TREMOR: Chronic | ICD-10-CM

## 2021-08-31 DIAGNOSIS — G25.0 BENIGN ESSENTIAL TREMOR: Primary | ICD-10-CM

## 2021-08-31 DIAGNOSIS — R49.0 DYSPHONIA OF ORGANIC TREMOR: Chronic | ICD-10-CM

## 2021-08-31 PROCEDURE — 99213 OFFICE O/P EST LOW 20 MIN: CPT | Performed by: PSYCHIATRY & NEUROLOGY

## 2021-08-31 RX ORDER — PRIMIDONE 50 MG/1
50 TABLET ORAL NIGHTLY
Qty: 90 TABLET | Refills: 3 | Status: SHIPPED | OUTPATIENT
Start: 2021-08-31 | End: 2022-08-31 | Stop reason: SDUPTHER

## 2021-10-18 ENCOUNTER — OFFICE VISIT (OUTPATIENT)
Dept: CARDIOLOGY | Facility: CLINIC | Age: 71
End: 2021-10-18

## 2021-10-18 VITALS
OXYGEN SATURATION: 99 % | BODY MASS INDEX: 34.87 KG/M2 | HEART RATE: 79 BPM | HEIGHT: 66 IN | DIASTOLIC BLOOD PRESSURE: 79 MMHG | WEIGHT: 217 LBS | SYSTOLIC BLOOD PRESSURE: 142 MMHG

## 2021-10-18 DIAGNOSIS — I10 ESSENTIAL HYPERTENSION: ICD-10-CM

## 2021-10-18 DIAGNOSIS — E11.9 TYPE 2 DIABETES MELLITUS WITHOUT COMPLICATION, WITHOUT LONG-TERM CURRENT USE OF INSULIN (HCC): ICD-10-CM

## 2021-10-18 DIAGNOSIS — R06.02 SHORTNESS OF BREATH: ICD-10-CM

## 2021-10-18 DIAGNOSIS — I48.0 PAROXYSMAL ATRIAL FIBRILLATION (HCC): Primary | ICD-10-CM

## 2021-10-18 DIAGNOSIS — E78.2 MIXED HYPERLIPIDEMIA: ICD-10-CM

## 2021-10-18 PROCEDURE — 93000 ELECTROCARDIOGRAM COMPLETE: CPT | Performed by: INTERNAL MEDICINE

## 2021-10-18 PROCEDURE — 99214 OFFICE O/P EST MOD 30 MIN: CPT | Performed by: INTERNAL MEDICINE

## 2021-10-18 NOTE — PROGRESS NOTES
Encounter Date:10/18/2021  Last seen 3/16/2021      Patient ID: Tone Weber is a 71 y.o. male.    Chief Complaint:  History of atrial fibrillation  Hypertension  Dyslipidemia  History of positive COVID March 2020     History of Present Illness  Since I have last seen, the patient has been without any chest discomfort ,shortness of breath, palpitations, dizziness or syncope.  Denies having any headache ,abdominal pain ,nausea, vomiting , diarrhea constipation, loss of weight or loss of appetite.  Denies having any excessive bruising ,hematuria or blood in the stool.    Review of all systems negative except as indicated.    Reviewed ROS.    Assessment and Plan      ]]]]]]]]]]]]]]]]]]  Impression  =========  -Shortness of breath with exertion-improved    Echocardiogram 6/16/2020-normal  Lexiscan Cardiolite test-normal 7/7/2020     - History of COVID positive pneumonia-improved.  March 2020     - History of atrial fibrillation while patient had pneumonia.  Patient is in sinus rhythm now.     -Hypertension dyslipidemia diabetes     -Family history is positive for coronary artery disease.     -Former smoker     -No known allergies     -No significant surgical problems in the past.  =======  Plan  =======  History of shortness of breath-improved.  EKG showed sinus rhythm without any ischemic changes-10/18/2021  Patient is not having any angina pectoris or congestive heart failure.     History of atrial fibrillation-maintaining sinus rhythm.  EKG showed sinus rhythm     Hypertension-controlled  142/79    Dyslipidemia-continue simvastatin     Medications were reviewed and updated.  Follow-up in the office in 6 months.  Further plan will depend on patient's progress.  ]]]]]]]]]]]]]]]]]                     Diagnosis Plan   1. Paroxysmal atrial fibrillation (HCC)  ECG 12 Lead   2. Shortness of breath  ECG 12 Lead   3. Mixed hyperlipidemia  ECG 12 Lead   4. Essential hypertension  ECG 12 Lead   5. Type 2 diabetes mellitus  without complication, without long-term current use of insulin (HCC)  ECG 12 Lead   LAB RESULTS (LAST 7 DAYS)    CBC        BMP        CMP         BNP        TROPONIN        CoAg        Creatinine Clearance  CrCl cannot be calculated (Patient's most recent lab result is older than the maximum 30 days allowed.).    ABG        Radiology  No radiology results for the last day                The following portions of the patient's history were reviewed and updated as appropriate: allergies, current medications, past family history, past medical history, past social history, past surgical history and problem list.    Review of Systems   Constitutional: Negative for malaise/fatigue.   Cardiovascular: Negative for chest pain, leg swelling, palpitations and syncope.   Respiratory: Positive for shortness of breath (with stairs).    Skin: Negative for rash.   Gastrointestinal: Negative for nausea and vomiting.   Neurological: Negative for dizziness, light-headedness and numbness.   All other systems reviewed and are negative.        Current Outpatient Medications:   •  aspirin 81 MG chewable tablet, Chew 81 mg Every Other Day., Disp: , Rfl:   •  coenzyme Q10 100 MG capsule, Take 100 mg by mouth Daily., Disp: , Rfl:   •  ezetimibe (ZETIA) 10 MG tablet, Take 10 mg by mouth Daily., Disp: , Rfl: 1  •  ibuprofen (ADVIL,MOTRIN) 200 MG tablet, Take 200 mg by mouth Every 6 (Six) Hours As Needed for Mild Pain ., Disp: , Rfl:   •  lisinopril (PRINIVIL,ZESTRIL) 10 MG tablet, Take 10 mg by mouth Daily., Disp: , Rfl: 1  •  Loratadine 10 MG capsule, Take  by mouth., Disp: , Rfl:   •  metFORMIN (GLUCOPHAGE) 1000 MG tablet, Take 1,000 mg by mouth 2 (Two) Times a Day With Meals., Disp: , Rfl:   •  Multiple Vitamins-Minerals (MULTIVITAMIN WITH MINERALS) tablet tablet, Take 1 tablet by mouth Daily., Disp: , Rfl:   •  primidone (MYSOLINE) 50 MG tablet, Take 1 tablet by mouth Every Night., Disp: 90 tablet, Rfl: 3  •  simvastatin (ZOCOR) 40 MG  "tablet, Take 40 mg by mouth Daily., Disp: , Rfl: 2    No Known Allergies    Family History   Problem Relation Age of Onset   • Heart disease Father    • Diabetes Father    • Parkinsonism Father        Past Surgical History:   Procedure Laterality Date   • CATARACT EXTRACTION, BILATERAL Bilateral 6/22/21,  6/29/2021       Past Medical History:   Diagnosis Date   • Atrial fibrillation (HCC)    • Diabetes (HCC)    • Dysphonia of organic tremor    • HTN (hypertension)    • Hyperlipidemia    • Hypertension    • Macrocytosis    • Renal insufficiency        Family History   Problem Relation Age of Onset   • Heart disease Father    • Diabetes Father    • Parkinsonism Father        Social History     Socioeconomic History   • Marital status:    Tobacco Use   • Smoking status: Former Smoker   • Smokeless tobacco: Never Used   Vaping Use   • Vaping Use: Never used   Substance and Sexual Activity   • Alcohol use: No   • Drug use: No   • Sexual activity: Yes     Partners: Female           ECG 12 Lead    Date/Time: 10/18/2021 2:35 PM  Performed by: Willi Frederick MD  Authorized by: Willi Frederick MD   Comparison: compared with previous ECG   Similar to previous ECG  Comparison to previous ECG: Normal sinus rhythm left anterior fascicular block nonspecific ST-T wave changes 77/min normal intervals no ectopy no significant change from 3/15/2021                Objective:       Physical Exam    /79 (BP Location: Left arm, Patient Position: Sitting, Cuff Size: Large Adult)   Pulse 79   Ht 167.6 cm (66\")   Wt 98.4 kg (217 lb)   SpO2 99%   BMI 35.02 kg/m²   The patient is alert, oriented and in no distress.    Vital signs as noted above.    Head and neck revealed no carotid bruits or jugular venous distension.  No thyromegaly or lymphadenopathy is present.    Lungs clear.  No wheezing.  Breath sounds are normal bilaterally.    Heart normal first and second heart sounds.  No murmur..  No pericardial rub is " present.  No gallop is present.    Abdomen soft and nontender.  No organomegaly is present.    Extremities revealed good peripheral pulses without any pedal edema.    Skin warm and dry.    Musculoskeletal system is grossly normal.    CNS grossly normal.    Reviewed and unchanged from last visit.

## 2021-12-30 PROCEDURE — U0005 INFEC AGEN DETEC AMPLI PROBE: HCPCS | Performed by: NURSE PRACTITIONER

## 2021-12-30 PROCEDURE — U0004 COV-19 TEST NON-CDC HGH THRU: HCPCS | Performed by: NURSE PRACTITIONER

## 2022-04-20 ENCOUNTER — OFFICE VISIT (OUTPATIENT)
Dept: CARDIOLOGY | Facility: CLINIC | Age: 72
End: 2022-04-20

## 2022-04-20 VITALS
WEIGHT: 214 LBS | HEART RATE: 80 BPM | BODY MASS INDEX: 34.39 KG/M2 | OXYGEN SATURATION: 96 % | SYSTOLIC BLOOD PRESSURE: 129 MMHG | HEIGHT: 66 IN | DIASTOLIC BLOOD PRESSURE: 79 MMHG

## 2022-04-20 DIAGNOSIS — I48.0 PAROXYSMAL ATRIAL FIBRILLATION: Primary | ICD-10-CM

## 2022-04-20 DIAGNOSIS — I10 ESSENTIAL HYPERTENSION: ICD-10-CM

## 2022-04-20 DIAGNOSIS — E11.9 TYPE 2 DIABETES MELLITUS WITHOUT COMPLICATION, WITHOUT LONG-TERM CURRENT USE OF INSULIN: ICD-10-CM

## 2022-04-20 DIAGNOSIS — R06.02 SHORTNESS OF BREATH: ICD-10-CM

## 2022-04-20 DIAGNOSIS — E78.2 MIXED HYPERLIPIDEMIA: ICD-10-CM

## 2022-04-20 PROCEDURE — 93000 ELECTROCARDIOGRAM COMPLETE: CPT | Performed by: INTERNAL MEDICINE

## 2022-04-20 PROCEDURE — 99214 OFFICE O/P EST MOD 30 MIN: CPT | Performed by: INTERNAL MEDICINE

## 2022-04-20 RX ORDER — TRIAMCINOLONE ACETONIDE 55 UG/1
2 SPRAY, METERED NASAL DAILY
COMMUNITY

## 2022-04-20 NOTE — PROGRESS NOTES
Encounter Date:04/20/2022  Last seen 10/18/2021      Patient ID: Tone Weber is a 71 y.o. male.    Chief Complaint:  History of atrial fibrillation  Hypertension  Dyslipidemia  History of positive COVID March 2020     History of Present Illness  Since I have last seen, the patient has been without any chest discomfort ,shortness of breath, palpitations, dizziness or syncope.  Denies having any headache ,abdominal pain ,nausea, vomiting , diarrhea constipation, loss of weight or loss of appetite.  Denies having any excessive bruising ,hematuria or blood in the stool.    Review of all systems negative except as indicated.    Reviewed ROS.  Assessment and Plan      ]]]]]]]]]]]]]]]]]]  Impression  =========  -Shortness of breath with exertion-improved     Echocardiogram 6/16/2020-normal  Lexiscan Cardiolite test-normal 7/7/2020     - History of COVID positive pneumonia-improved.  March 2020     - History of atrial fibrillation while patient had pneumonia.  Patient is in sinus rhythm now.     -Hypertension dyslipidemia diabetes     -Family history is positive for coronary artery disease.     -Former smoker     -No known allergies     -No significant surgical problems in the past.  =======  Plan  =======  History of shortness of breath-improved.  EKG showed sinus rhythm without ischemic changes.  Patient is not having any angina pectoris or congestive heart failure.     History of atrial fibrillation-maintaining sinus rhythm.  EKG showed sinus rhythm-4/20/2022     Hypertension-controlled  129/79    Dyslipidemia-continue simvastatin     Medications were reviewed and updated.  Follow-up in the office in 6 months.  Further plan will depend on patient's progress.  ]]]]]]]]]]]]]]]]]                              Diagnosis Plan   1. Paroxysmal atrial fibrillation (HCC)     2. Mixed hyperlipidemia     3. Essential hypertension     4. Shortness of breath     5. Type 2 diabetes mellitus without complication, without long-term  current use of insulin (HCC)     LAB RESULTS (LAST 7 DAYS)    CBC        BMP        CMP         BNP        TROPONIN        CoAg        Creatinine Clearance  CrCl cannot be calculated (Patient's most recent lab result is older than the maximum 30 days allowed.).    ABG        Radiology  No radiology results for the last day                The following portions of the patient's history were reviewed and updated as appropriate: allergies, current medications, past family history, past medical history, past social history, past surgical history and problem list.    Review of Systems   Constitutional: Negative for malaise/fatigue.   Cardiovascular: Negative for chest pain, leg swelling, palpitations and syncope.   Respiratory: Negative for shortness of breath.    Skin: Negative for rash.   Gastrointestinal: Negative for nausea and vomiting.   Neurological: Negative for dizziness, light-headedness and numbness.   All other systems reviewed and are negative.        Current Outpatient Medications:   •  aspirin 81 MG chewable tablet, Chew 81 mg Every Other Day., Disp: , Rfl:   •  azelastine (ASTELIN) 0.1 % nasal spray, 2 sprays into the nostril(s) as directed by provider 2 (Two) Times a Day. Use in each nostril as directed, Disp: 30 mL, Rfl: 0  •  coenzyme Q10 100 MG capsule, Take 100 mg by mouth Daily., Disp: , Rfl:   •  ezetimibe (ZETIA) 10 MG tablet, Take 10 mg by mouth Daily., Disp: , Rfl: 1  •  ibuprofen (ADVIL,MOTRIN) 200 MG tablet, Take 200 mg by mouth Every 6 (Six) Hours As Needed for Mild Pain ., Disp: , Rfl:   •  lisinopril (PRINIVIL,ZESTRIL) 10 MG tablet, Take 10 mg by mouth Daily., Disp: , Rfl: 1  •  Loratadine 10 MG capsule, Take  by mouth., Disp: , Rfl:   •  metFORMIN (GLUCOPHAGE) 1000 MG tablet, Take 1,000 mg by mouth 2 (Two) Times a Day With Meals., Disp: , Rfl:   •  Multiple Vitamins-Minerals (MULTIVITAMIN WITH MINERALS) tablet tablet, Take 1 tablet by mouth Daily., Disp: , Rfl:   •  primidone (MYSOLINE) 50  "MG tablet, Take 1 tablet by mouth Every Night., Disp: 90 tablet, Rfl: 3  •  simvastatin (ZOCOR) 40 MG tablet, Take 40 mg by mouth Daily., Disp: , Rfl: 2  •  Triamcinolone Acetonide (NASACORT) 55 MCG/ACT nasal inhaler, 2 sprays into the nostril(s) as directed by provider Daily., Disp: , Rfl:     No Known Allergies    Family History   Problem Relation Age of Onset   • Heart disease Father    • Diabetes Father    • Parkinsonism Father        Past Surgical History:   Procedure Laterality Date   • CATARACT EXTRACTION, BILATERAL Bilateral 6/22/21,  6/29/2021   • EYE SURGERY         Past Medical History:   Diagnosis Date   • Atrial fibrillation (HCC)    • Diabetes (HCC)    • Dysphonia of organic tremor    • HTN (hypertension)    • Hyperlipidemia    • Hypertension    • Macrocytosis    • Renal insufficiency        Family History   Problem Relation Age of Onset   • Heart disease Father    • Diabetes Father    • Parkinsonism Father        Social History     Socioeconomic History   • Marital status:    Tobacco Use   • Smoking status: Former Smoker   • Smokeless tobacco: Never Used   Vaping Use   • Vaping Use: Never used   Substance and Sexual Activity   • Alcohol use: No   • Drug use: No   • Sexual activity: Yes     Partners: Female           ECG 12 Lead    Date/Time: 4/20/2022 2:03 PM  Performed by: Willi Frederick MD  Authorized by: Willi Frederick MD   Comparison: compared with previous ECG   Similar to previous ECG  Comparison to previous ECG: Normal sinus rhythm nonspecific ST-T wave changes 75/min normal axis normal intervals no ectopy no significant change from 10/18/2021                Objective:       Physical Exam    /79 (BP Location: Left arm, Patient Position: Sitting, Cuff Size: Large Adult)   Pulse 80   Ht 167.6 cm (66\")   Wt 97.1 kg (214 lb)   SpO2 96%   BMI 34.54 kg/m²   The patient is alert, oriented and in no distress.    Vital signs as noted above.    Head and neck revealed no carotid " bruits or jugular venous distension.  No thyromegaly or lymphadenopathy is present.    Lungs clear.  No wheezing.  Breath sounds are normal bilaterally.    Heart normal first and second heart sounds.  No murmur..  No pericardial rub is present.  No gallop is present.    Abdomen soft and nontender.  No organomegaly is present.    Extremities revealed good peripheral pulses without any pedal edema.    Skin warm and dry.    Musculoskeletal system is grossly normal.    CNS grossly normal.    Reviewed and updated.

## 2022-08-30 NOTE — PROGRESS NOTES
Chief Complaint  Tremors    Subjective          Tone Weber presents to [facility] for Tremor's  History of Present Illness    f/u tremors ( bilateral hand)  Tremors are gradually worsening , effecting his writing.          ====previous ov 8/31/21 dr seipel=====  Patient is here for follow up on tremors in bilateral hands he states tremors are better,he states medication seems to help a lot and is allowing him to work with his hands again he is currently taking mysoline 50mg 1 qhs     Tremor gradually worsened over the years. No tremor of head, no trouble walking          Current Outpatient Medications:   •  aspirin 81 MG chewable tablet, Chew 81 mg Every Other Day., Disp: , Rfl:   •  coenzyme Q10 100 MG capsule, Take 100 mg by mouth Daily., Disp: , Rfl:   •  ezetimibe (ZETIA) 10 MG tablet, Take 10 mg by mouth Daily., Disp: , Rfl: 1  •  ibuprofen (ADVIL,MOTRIN) 200 MG tablet, Take 200 mg by mouth Every 6 (Six) Hours As Needed for Mild Pain ., Disp: , Rfl:   •  lisinopril (PRINIVIL,ZESTRIL) 10 MG tablet, Take 10 mg by mouth Daily., Disp: , Rfl: 1  •  Loratadine 10 MG capsule, Take  by mouth., Disp: , Rfl:   •  metFORMIN (GLUCOPHAGE) 1000 MG tablet, Take 1,000 mg by mouth 2 (Two) Times a Day With Meals., Disp: , Rfl:   •  Multiple Vitamins-Minerals (MULTIVITAMIN WITH MINERALS) tablet tablet, Take 1 tablet by mouth Daily., Disp: , Rfl:   •  primidone (MYSOLINE) 50 MG tablet, One or two tab per day, Disp: 180 tablet, Rfl: 3  •  simvastatin (ZOCOR) 40 MG tablet, Take 40 mg by mouth Daily., Disp: , Rfl: 2  •  Triamcinolone Acetonide (NASACORT) 55 MCG/ACT nasal inhaler, 2 sprays into the nostril(s) as directed by provider Daily., Disp: , Rfl:   •  azelastine (ASTELIN) 0.1 % nasal spray, 2 sprays into the nostril(s) as directed by provider 2 (Two) Times a Day. Use in each nostril as directed, Disp: 30 mL, Rfl: 0    Review of Systems   Constitutional: Negative for fatigue.   Neurological: Positive  "for tremors. Negative for weakness and headaches.   Psychiatric/Behavioral: Negative for confusion and decreased concentration.          Objective:    Vital Signs:   /80   Pulse 73   Temp 99.2 °F (37.3 °C)   Ht 167.6 cm (65.98\")   Wt 96.3 kg (212 lb 6.4 oz)   SpO2 98%   BMI 34.30 kg/m²     Physical Exam  Vitals reviewed.   HENT:      Head: Normocephalic.      Nose: Nose normal.   Cardiovascular:      Rate and Rhythm: Normal rate.      Pulses: Normal pulses.   Pulmonary:      Effort: Pulmonary effort is normal.   Neurological:      General: No focal deficit present.      Mental Status: He is alert and oriented to person, place, and time.   Psychiatric:         Mood and Affect: Mood normal.        Result Review :                Neurologic Exam     Mental Status   Oriented to person, place, and time.         Assessment and Plan    Diagnoses and all orders for this visit:    1. Essential tremor (Primary)    Other orders  -     primidone (MYSOLINE) 50 MG tablet; One or two tab per day  Dispense: 180 tablet; Refill: 3     increase mysoline to 100 mg per day    Follow Up   Return in about 1 year (around 8/31/2023).  Patient was given instructions and counseling regarding his condition or for health maintenance advice. Please see specific information pulled into the AVS if appropriate.     This document has been electronically signed by Joseph Seipel, MD on August 31, 2022 15:12 EDT      "

## 2022-08-31 ENCOUNTER — OFFICE VISIT (OUTPATIENT)
Dept: NEUROLOGY | Facility: CLINIC | Age: 72
End: 2022-08-31

## 2022-08-31 VITALS
TEMPERATURE: 99.2 F | DIASTOLIC BLOOD PRESSURE: 80 MMHG | HEART RATE: 73 BPM | HEIGHT: 66 IN | SYSTOLIC BLOOD PRESSURE: 140 MMHG | WEIGHT: 212.4 LBS | OXYGEN SATURATION: 98 % | BODY MASS INDEX: 34.13 KG/M2

## 2022-08-31 DIAGNOSIS — G25.0 ESSENTIAL TREMOR: Primary | ICD-10-CM

## 2022-08-31 PROCEDURE — 99214 OFFICE O/P EST MOD 30 MIN: CPT | Performed by: PSYCHIATRY & NEUROLOGY

## 2022-08-31 RX ORDER — PRIMIDONE 50 MG/1
TABLET ORAL
Qty: 180 TABLET | Refills: 3 | Status: SHIPPED | OUTPATIENT
Start: 2022-08-31 | End: 2022-09-12

## 2022-09-12 RX ORDER — PRIMIDONE 50 MG/1
TABLET ORAL
Qty: 90 TABLET | Refills: 3 | Status: SHIPPED | OUTPATIENT
Start: 2022-09-12

## 2022-10-20 ENCOUNTER — OFFICE VISIT (OUTPATIENT)
Dept: CARDIOLOGY | Facility: CLINIC | Age: 72
End: 2022-10-20

## 2022-10-20 VITALS
HEART RATE: 79 BPM | DIASTOLIC BLOOD PRESSURE: 81 MMHG | OXYGEN SATURATION: 99 % | SYSTOLIC BLOOD PRESSURE: 133 MMHG | HEIGHT: 66 IN | BODY MASS INDEX: 34.55 KG/M2 | WEIGHT: 215 LBS

## 2022-10-20 DIAGNOSIS — I48.0 PAROXYSMAL ATRIAL FIBRILLATION: Primary | ICD-10-CM

## 2022-10-20 DIAGNOSIS — R06.02 SHORTNESS OF BREATH: ICD-10-CM

## 2022-10-20 DIAGNOSIS — I10 ESSENTIAL HYPERTENSION: ICD-10-CM

## 2022-10-20 DIAGNOSIS — E11.9 TYPE 2 DIABETES MELLITUS WITHOUT COMPLICATION, WITHOUT LONG-TERM CURRENT USE OF INSULIN: ICD-10-CM

## 2022-10-20 DIAGNOSIS — E78.2 MIXED HYPERLIPIDEMIA: ICD-10-CM

## 2022-10-20 PROCEDURE — 99214 OFFICE O/P EST MOD 30 MIN: CPT | Performed by: INTERNAL MEDICINE

## 2022-10-20 PROCEDURE — 93000 ELECTROCARDIOGRAM COMPLETE: CPT | Performed by: INTERNAL MEDICINE

## 2022-10-20 NOTE — PROGRESS NOTES
Encounter Date:10/20/2022  Last seen 4/20/2022      Patient ID: Tone Weber is a 72 y.o. male.    Chief Complaint:  History of atrial fibrillation  Hypertension  Dyslipidemia  History of positive COVID March 2020     History of Present Illness  Since I have last seen, the patient has been without any chest discomfort ,shortness of breath, palpitations, dizziness or syncope.  Denies having any headache ,abdominal pain ,nausea, vomiting , diarrhea constipation, loss of weight or loss of appetite.  Denies having any excessive bruising ,hematuria or blood in the stool.    Review of all systems negative except as indicated.    Reviewed ROS.  Assessment and Plan      ]]]]]]]]]]]]]]]]]]  Impression  =========  -Shortness of breath with exertion-improved     Echocardiogram 6/16/2020-normal  Lexiscan Cardiolite test-normal 7/7/2020     - History of COVID positive pneumonia-improved.  March 2020     - History of atrial fibrillation while patient had pneumonia.  Patient is in sinus rhythm now.     -Hypertension dyslipidemia diabetes     -Family history is positive for coronary artery disease.     -Former smoker     -No known allergies     -No significant surgical problems in the past.  =======  Plan  =======  History of shortness of breath-improved.  EKG showed sinus rhythm without ischemic changes.  10/20/2022  Patient is not having any angina pectoris or congestive heart failure.     History of atrial fibrillation-maintaining sinus rhythm.  EKG showed sinus rhythm- 10/20/2022    Hypertension-controlled  133/80     Dyslipidemia-continue simvastatin     Medications were reviewed and updated.    Follow-up in the office in 6 months.  Further plan will depend on patient's progress.  ]]]]]]]]]]]]]]]]]                          No diagnosis found.LAB RESULTS (LAST 7 DAYS)    CBC        BMP        CMP         BNP        TROPONIN        CoAg        Creatinine Clearance  CrCl cannot be calculated (Patient's most recent lab result is  older than the maximum 30 days allowed.).    ABG        Radiology  No radiology results for the last day                The following portions of the patient's history were reviewed and updated as appropriate: allergies, current medications, past family history, past medical history, past social history, past surgical history and problem list.    Review of Systems   Constitutional: Negative for malaise/fatigue.   Cardiovascular: Negative for chest pain, leg swelling, palpitations and syncope.   Respiratory: Negative for shortness of breath.    Skin: Negative for rash.   Gastrointestinal: Negative for nausea and vomiting.   Neurological: Negative for dizziness, light-headedness and numbness.   All other systems reviewed and are negative.        Current Outpatient Medications:   •  aspirin 81 MG chewable tablet, Chew 81 mg Every Other Day., Disp: , Rfl:   •  azelastine (ASTELIN) 0.1 % nasal spray, 2 sprays into the nostril(s) as directed by provider 2 (Two) Times a Day. Use in each nostril as directed, Disp: 30 mL, Rfl: 0  •  coenzyme Q10 100 MG capsule, Take 100 mg by mouth Daily., Disp: , Rfl:   •  ezetimibe (ZETIA) 10 MG tablet, Take 10 mg by mouth Daily., Disp: , Rfl: 1  •  ibuprofen (ADVIL,MOTRIN) 200 MG tablet, Take 200 mg by mouth Every 6 (Six) Hours As Needed for Mild Pain ., Disp: , Rfl:   •  lisinopril (PRINIVIL,ZESTRIL) 10 MG tablet, Take 10 mg by mouth Daily., Disp: , Rfl: 1  •  Loratadine 10 MG capsule, Take  by mouth., Disp: , Rfl:   •  metFORMIN (GLUCOPHAGE) 1000 MG tablet, Take 1,000 mg by mouth 2 (Two) Times a Day With Meals., Disp: , Rfl:   •  Multiple Vitamins-Minerals (MULTIVITAMIN WITH MINERALS) tablet tablet, Take 1 tablet by mouth Daily., Disp: , Rfl:   •  primidone (MYSOLINE) 50 MG tablet, TAKE ONE TABLET BY MOUTH ONCE NIGHTLY, Disp: 90 tablet, Rfl: 3  •  simvastatin (ZOCOR) 40 MG tablet, Take 40 mg by mouth Daily., Disp: , Rfl: 2  •  Triamcinolone Acetonide (NASACORT) 55 MCG/ACT nasal inhaler,  "2 sprays into the nostril(s) as directed by provider Daily., Disp: , Rfl:     No Known Allergies    Family History   Problem Relation Age of Onset   • Heart disease Father    • Diabetes Father    • Parkinsonism Father        Past Surgical History:   Procedure Laterality Date   • CATARACT EXTRACTION, BILATERAL Bilateral 6/22/21,  6/29/2021   • EYE SURGERY         Past Medical History:   Diagnosis Date   • Atrial fibrillation (HCC)    • Diabetes (HCC)    • Dysphonia of organic tremor    • HTN (hypertension)    • Hyperlipidemia    • Hypertension    • Macrocytosis    • Renal insufficiency        Family History   Problem Relation Age of Onset   • Heart disease Father    • Diabetes Father    • Parkinsonism Father        Social History     Socioeconomic History   • Marital status:    Tobacco Use   • Smoking status: Former   • Smokeless tobacco: Never   Vaping Use   • Vaping Use: Never used   Substance and Sexual Activity   • Alcohol use: No   • Drug use: No   • Sexual activity: Yes     Partners: Female           ECG 12 Lead    Date/Time: 10/20/2022 3:50 PM  Performed by: Willi Frederick MD  Authorized by: Willi Frederick MD   Comparison: compared with previous ECG   Similar to previous ECG  Comparison to previous ECG: Normal sinus rhythm nonspecific ST-T wave changes 74/min normal axis normal intervals no ectopy no significant change from 4/20/2022                Objective:       Physical Exam    /81 (BP Location: Left arm, Patient Position: Sitting, Cuff Size: Large Adult)   Pulse 79   Ht 167.6 cm (66\")   Wt 97.5 kg (215 lb)   SpO2 99%   BMI 34.70 kg/m²   The patient is alert, oriented and in no distress.    Vital signs as noted above.    Head and neck revealed no carotid bruits or jugular venous distension.  No thyromegaly or lymphadenopathy is present.    Lungs clear.  No wheezing.  Breath sounds are normal bilaterally.    Heart normal first and second heart sounds.  No murmur..  No pericardial rub " is present.  No gallop is present.    Abdomen soft and nontender.  No organomegaly is present.    Extremities revealed good peripheral pulses without any pedal edema.    Skin warm and dry.    Musculoskeletal system is grossly normal.    CNS grossly normal.    Reviewed and updated.

## 2023-04-20 ENCOUNTER — OFFICE VISIT (OUTPATIENT)
Dept: CARDIOLOGY | Facility: CLINIC | Age: 73
End: 2023-04-20
Payer: MEDICARE

## 2023-04-20 VITALS
WEIGHT: 210 LBS | DIASTOLIC BLOOD PRESSURE: 57 MMHG | SYSTOLIC BLOOD PRESSURE: 108 MMHG | HEIGHT: 66 IN | OXYGEN SATURATION: 96 % | BODY MASS INDEX: 33.75 KG/M2 | HEART RATE: 89 BPM

## 2023-04-20 DIAGNOSIS — R06.02 SHORTNESS OF BREATH: ICD-10-CM

## 2023-04-20 DIAGNOSIS — I10 ESSENTIAL HYPERTENSION: ICD-10-CM

## 2023-04-20 DIAGNOSIS — I48.0 PAROXYSMAL ATRIAL FIBRILLATION: Primary | ICD-10-CM

## 2023-04-20 DIAGNOSIS — E78.2 MIXED HYPERLIPIDEMIA: ICD-10-CM

## 2023-04-20 DIAGNOSIS — E11.9 TYPE 2 DIABETES MELLITUS WITHOUT COMPLICATION, WITHOUT LONG-TERM CURRENT USE OF INSULIN: ICD-10-CM

## 2023-04-20 NOTE — PROGRESS NOTES
Encounter Date:04/20/2023  Last seen 10/20/2022      Patient ID: Tone Weber is a 72 y.o. male.    Chief Complaint:    History of atrial fibrillation  Hypertension  Dyslipidemia  History of positive COVID March 2020     History of Present Illness  Since I have last seen, the patient has been without any chest discomfort ,shortness of breath, palpitations, dizziness or syncope.  Denies having any headache ,abdominal pain ,nausea, vomiting , diarrhea constipation, loss of weight or loss of appetite.  Denies having any excessive bruising ,hematuria or blood in the stool.    Review of all systems negative except as indicated.    Reviewed ROS.    Assessment and Plan      ]]]]]]]]]]]]]]]]]]  Impression  =========  - History of atrial fibrillation in the setting of pneumonia.  Patient is maintaining sinus rhythm.-EKG 4/20/2023    -Shortness of breath with exertion-improved     Echocardiogram 6/16/2020-normal  Lexiscan Cardiolite test-normal 7/7/2020     - History of COVID positive pneumonia-improved.  March 2020     -Hypertension dyslipidemia diabetes     -Family history is positive for coronary artery disease.     -Former smoker     -No known allergies     -No significant surgical problems in the past.  =======  Plan  =======  History of shortness of breath-improved.    History of atrial fibrillation-maintaining sinus rhythm.  EKG showed sinus rhythm-  4/20/2023     Hypertension-controlled  108/60     Dyslipidemia-continue simvastatin     Medications were reviewed and updated.     Follow-up in the office in 6 months.    Further plan will depend on patient's progress.  ]]]]]]]]]]]]]]]]]                       Diagnosis Plan   1. Paroxysmal atrial fibrillation  ECG 12 Lead      2. Mixed hyperlipidemia  ECG 12 Lead      3. Essential hypertension  ECG 12 Lead      4. Type 2 diabetes mellitus without complication, without long-term current use of insulin  ECG 12 Lead      5. Shortness of breath  ECG 12 Lead      LAB RESULTS  (LAST 7 DAYS)    CBC        BMP        CMP         BNP        TROPONIN        CoAg        Creatinine Clearance  CrCl cannot be calculated (Patient's most recent lab result is older than the maximum 30 days allowed.).    ABG        Radiology  No radiology results for the last day                The following portions of the patient's history were reviewed and updated as appropriate: allergies, current medications, past family history, past medical history, past social history, past surgical history and problem list.    Review of Systems   Constitutional: Negative for malaise/fatigue.   Cardiovascular: Negative for chest pain, leg swelling, palpitations and syncope.   Respiratory: Negative for shortness of breath.    Skin: Negative for rash.   Gastrointestinal: Negative for nausea and vomiting.   Neurological: Negative for dizziness, light-headedness and numbness.   All other systems reviewed and are negative.        Current Outpatient Medications:   •  aspirin 81 MG chewable tablet, Chew 1 tablet Every Other Day., Disp: , Rfl:   •  azelastine (ASTELIN) 0.1 % nasal spray, 2 sprays into the nostril(s) as directed by provider 2 (Two) Times a Day. Use in each nostril as directed, Disp: 30 mL, Rfl: 0  •  coenzyme Q10 100 MG capsule, Take 1 capsule by mouth Daily., Disp: , Rfl:   •  ezetimibe (ZETIA) 10 MG tablet, Take 1 tablet by mouth Daily., Disp: , Rfl: 1  •  ibuprofen (ADVIL,MOTRIN) 200 MG tablet, Take 1 tablet by mouth Every 6 (Six) Hours As Needed for Mild Pain., Disp: , Rfl:   •  lisinopril (PRINIVIL,ZESTRIL) 10 MG tablet, Take 1 tablet by mouth Daily., Disp: , Rfl: 1  •  Loratadine 10 MG capsule, Take  by mouth., Disp: , Rfl:   •  metFORMIN (GLUCOPHAGE) 1000 MG tablet, Take 1 tablet by mouth 2 (Two) Times a Day With Meals., Disp: , Rfl:   •  Multiple Vitamins-Minerals (MULTIVITAMIN WITH MINERALS) tablet tablet, Take 1 tablet by mouth Daily., Disp: , Rfl:   •  primidone (MYSOLINE) 50 MG tablet, TAKE ONE TABLET BY  "MOUTH ONCE NIGHTLY, Disp: 90 tablet, Rfl: 3  •  simvastatin (ZOCOR) 40 MG tablet, Take 1 tablet by mouth Daily., Disp: , Rfl: 2  •  Triamcinolone Acetonide (NASACORT) 55 MCG/ACT nasal inhaler, 2 sprays into the nostril(s) as directed by provider Daily., Disp: , Rfl:     No Known Allergies    Family History   Problem Relation Age of Onset   • Heart disease Father    • Diabetes Father    • Parkinsonism Father    • Heart attack Father        Past Surgical History:   Procedure Laterality Date   • CATARACT EXTRACTION, BILATERAL Bilateral 6/22/21,  6/29/2021   • EYE SURGERY         Past Medical History:   Diagnosis Date   • Atrial fibrillation    • Diabetes    • Dysphonia of organic tremor    • HTN (hypertension)    • Hyperlipidemia    • Hypertension    • Macrocytosis    • Renal insufficiency        Family History   Problem Relation Age of Onset   • Heart disease Father    • Diabetes Father    • Parkinsonism Father    • Heart attack Father        Social History     Socioeconomic History   • Marital status:    Tobacco Use   • Smoking status: Former   • Smokeless tobacco: Never   Vaping Use   • Vaping Use: Never used   Substance and Sexual Activity   • Alcohol use: No   • Drug use: No   • Sexual activity: Yes     Partners: Female           ECG 12 Lead    Date/Time: 4/20/2023 4:02 PM  Performed by: Willi Frederick MD  Authorized by: Willi Frederick MD   Comparison: compared with previous ECG   Similar to previous ECG  Comparison to previous ECG: Normal sinus rhythm nonspecific ST-T wave changes 89/min normal axis normal intervals no ectopy no significant change from previous EKG.                Objective:       Physical Exam    /57 (BP Location: Right arm, Patient Position: Sitting, Cuff Size: Large Adult)   Pulse 89   Ht 167.6 cm (66\")   Wt 95.3 kg (210 lb)   SpO2 96% Comment: RA  BMI 33.89 kg/m²   The patient is alert, oriented and in no distress.    Vital signs as noted above.    Head and neck " revealed no carotid bruits or jugular venous distension.  No thyromegaly or lymphadenopathy is present.    Lungs clear.  No wheezing.  Breath sounds are normal bilaterally.    Heart normal first and second heart sounds.  No murmur..  No pericardial rub is present.  No gallop is present.    Abdomen soft and nontender.  No organomegaly is present.    Extremities revealed good peripheral pulses without any pedal edema.    Skin warm and dry.    Musculoskeletal system is grossly normal.    CNS grossly normal.    Reviewed and updated.

## 2023-08-31 ENCOUNTER — OFFICE VISIT (OUTPATIENT)
Dept: NEUROLOGY | Facility: CLINIC | Age: 73
End: 2023-08-31
Payer: MEDICARE

## 2023-08-31 VITALS — HEIGHT: 66 IN | WEIGHT: 209 LBS | BODY MASS INDEX: 33.59 KG/M2

## 2023-08-31 DIAGNOSIS — G25.0 ESSENTIAL TREMOR: Primary | ICD-10-CM

## 2023-08-31 PROCEDURE — 1159F MED LIST DOCD IN RCRD: CPT | Performed by: PSYCHIATRY & NEUROLOGY

## 2023-08-31 PROCEDURE — 1160F RVW MEDS BY RX/DR IN RCRD: CPT | Performed by: PSYCHIATRY & NEUROLOGY

## 2023-08-31 PROCEDURE — 99213 OFFICE O/P EST LOW 20 MIN: CPT | Performed by: PSYCHIATRY & NEUROLOGY

## 2023-08-31 RX ORDER — PRIMIDONE 50 MG/1
TABLET ORAL
Qty: 180 TABLET | Refills: 3 | Status: SHIPPED | OUTPATIENT
Start: 2023-08-31

## 2023-08-31 NOTE — PROGRESS NOTES
Chief Complaint  Tremors    Subjective          Tone Weber presents to [facility] NEUROLOGY for TREMORS  History of Present Illness    Patient is here to f/u on tremors    Patient states that his tremors are still there, but says they are better than when he saw us last.    He currently takes Primidone. 100mg at hs          ===PREV. OV 8/31/22====  f/u tremors ( bilateral hand)  Tremors are gradually worsening , effecting his writing.       Current Outpatient Medications:     aspirin 81 MG chewable tablet, Chew 1 tablet Every Other Day., Disp: , Rfl:     azelastine (ASTELIN) 0.1 % nasal spray, 2 sprays into the nostril(s) as directed by provider 2 (Two) Times a Day. Use in each nostril as directed, Disp: 30 mL, Rfl: 0    coenzyme Q10 100 MG capsule, Take 1 capsule by mouth Daily., Disp: , Rfl:     ezetimibe (ZETIA) 10 MG tablet, Take 1 tablet by mouth Daily., Disp: , Rfl: 1    Fexofenadine HCl (ALLEGRA ALLERGY PO), Take  by mouth., Disp: , Rfl:     ibuprofen (ADVIL,MOTRIN) 200 MG tablet, Take 1 tablet by mouth Every 6 (Six) Hours As Needed for Mild Pain., Disp: , Rfl:     lisinopril (PRINIVIL,ZESTRIL) 10 MG tablet, Take 1 tablet by mouth Daily., Disp: , Rfl: 1    metFORMIN (GLUCOPHAGE) 1000 MG tablet, Take 1 tablet by mouth 2 (Two) Times a Day With Meals., Disp: , Rfl:     Multiple Vitamins-Minerals (MULTIVITAMIN WITH MINERALS) tablet tablet, Take 1 tablet by mouth Daily., Disp: , Rfl:     primidone (MYSOLINE) 50 MG tablet, TAKE two TABLET BY MOUTH NIGHTLY, Disp: 180 tablet, Rfl: 3    simvastatin (ZOCOR) 40 MG tablet, Take 1 tablet by mouth Daily., Disp: , Rfl: 2    Triamcinolone Acetonide (NASACORT) 55 MCG/ACT nasal inhaler, 2 sprays into the nostril(s) as directed by provider Daily., Disp: , Rfl:     Review of Systems   HENT:  Positive for hearing loss.    Neurological:  Positive for dizziness and tremors.   All other systems reviewed and are negative.       Objective:    Vital Signs:   Ht 167.6  "cm (66\")   Wt 94.8 kg (209 lb)   BMI 33.73 kg/mý     Physical Exam  Vitals reviewed.   Constitutional:       Appearance: Normal appearance.   Cardiovascular:      Rate and Rhythm: Normal rate.      Pulses: Normal pulses.   Pulmonary:      Effort: Pulmonary effort is normal.   Neurological:      General: No focal deficit present.      Mental Status: He is alert and oriented to person, place, and time.   Psychiatric:         Mood and Affect: Mood normal.      Result Review :                Neurologic Exam     Mental Status   Oriented to person, place, and time.       Assessment and Plan    Diagnoses and all orders for this visit:    1. Essential tremor (Primary)  -     primidone (MYSOLINE) 50 MG tablet; TAKE two TABLET BY MOUTH NIGHTLY  Dispense: 180 tablet; Refill: 3       Continue mysoline two tabs of 50mg daily         Follow Up   Return in about 1 year (around 8/31/2024).  Patient was given instructions and counseling regarding his condition or for health maintenance advice. Please see specific information pulled into the AVS if appropriate.     This document has been electronically signed by Joseph Seipel, MD on August 31, 2023 15:20 EDT      "

## 2023-10-22 NOTE — PROGRESS NOTES
Encounter Date:11/09/2023    Last seen 4/20/2023      Patient ID: Tone Weber is a 73 y.o. male.    Chief Complaint:     History of atrial fibrillation  Hypertension  Dyslipidemia  History of positive COVID March 2020     History of Present Illness  Chronic shortness of breath since patient had COVID infection.  Since I have last seen, the patient has been without any chest discomfort , unusual shortness of breath, palpitations, dizziness or syncope.  Denies having any headache ,abdominal pain ,nausea, vomiting , diarrhea constipation, loss of weight or loss of appetite.  Denies having any excessive bruising ,hematuria or blood in the stool.    Review of all systems negative except as indicated.    Reviewed ROS.    Assessment and Plan      ]]]]]]]]]]]]]]]]]]  History  =========  - History of atrial fibrillation in the setting of pneumonia.  Patient is maintaining sinus rhythm.-EKG 4/20/2023     -Shortness of breath with exertion-improved     Echocardiogram 6/16/2020-normal  Lexiscan Cardiolite test-normal 7/7/2020     - History of COVID positive pneumonia-improved.  March 2020     -Hypertension dyslipidemia diabetes     -Family history is positive for coronary artery disease.     -Former smoker     -No known allergies     -No significant surgical problems in the past.  =======  Plan  =======  History of shortness of breath-better but still present.  Patient has shortness of breath since he had COVID infection.    History of atrial fibrillation-maintaining sinus rhythm.  EKG showed sinus rhythm-  4/20/2023  EKG 11/9/2023 showed sinus rhythm left anterior fascicular block     Hypertension-controlled  130/77     Dyslipidemia-continue simvastatin     Medications were reviewed and updated.     Follow-up in the office in 6 months.     Further plan will depend on patient's progress.    Reviewed and updated-11/9/2023  ]]]]]]]]]]]]]]]]]            Diagnosis Plan   1. Paroxysmal atrial fibrillation        2. Mixed  hyperlipidemia        3. Essential hypertension        4. Type 2 diabetes mellitus without complication, without long-term current use of insulin        5. Shortness of breath        LAB RESULTS (LAST 7 DAYS)    CBC        BMP        CMP         BNP        TROPONIN        CoAg        Creatinine Clearance  CrCl cannot be calculated (Patient's most recent lab result is older than the maximum 30 days allowed.).    ABG        Radiology  No radiology results for the last day                The following portions of the patient's history were reviewed and updated as appropriate: allergies, current medications, past family history, past medical history, past social history, past surgical history, and problem list.    Review of Systems   Constitutional: Negative for malaise/fatigue.   Cardiovascular:  Negative for chest pain, dyspnea on exertion, leg swelling and palpitations.   Respiratory:  Positive for shortness of breath. Negative for cough.    Gastrointestinal:  Negative for abdominal pain, nausea and vomiting.   Neurological:  Negative for dizziness, focal weakness, headaches, light-headedness and numbness.   All other systems reviewed and are negative.      Current Outpatient Medications:   •  aspirin 81 MG chewable tablet, Chew 1 tablet Every Other Day., Disp: , Rfl:   •  azelastine (ASTELIN) 0.1 % nasal spray, 2 sprays into the nostril(s) as directed by provider 2 (Two) Times a Day. Use in each nostril as directed, Disp: 30 mL, Rfl: 0  •  coenzyme Q10 100 MG capsule, Take 1 capsule by mouth Daily., Disp: , Rfl:   •  ezetimibe (ZETIA) 10 MG tablet, Take 1 tablet by mouth Daily., Disp: , Rfl: 1  •  Fexofenadine HCl (ALLEGRA ALLERGY PO), Take  by mouth., Disp: , Rfl:   •  ibuprofen (ADVIL,MOTRIN) 200 MG tablet, Take 1 tablet by mouth Every 6 (Six) Hours As Needed for Mild Pain., Disp: , Rfl:   •  lisinopril (PRINIVIL,ZESTRIL) 10 MG tablet, Take 1 tablet by mouth Daily., Disp: , Rfl: 1  •  metFORMIN (GLUCOPHAGE) 1000  MG tablet, Take 1 tablet by mouth 2 (Two) Times a Day With Meals., Disp: , Rfl:   •  Multiple Vitamins-Minerals (MULTIVITAMIN WITH MINERALS) tablet tablet, Take 1 tablet by mouth Daily., Disp: , Rfl:   •  primidone (MYSOLINE) 50 MG tablet, TAKE two TABLET BY MOUTH NIGHTLY, Disp: 180 tablet, Rfl: 3  •  simvastatin (ZOCOR) 40 MG tablet, Take 1 tablet by mouth Daily., Disp: , Rfl: 2  •  Triamcinolone Acetonide (NASACORT) 55 MCG/ACT nasal inhaler, 2 sprays into the nostril(s) as directed by provider Daily., Disp: , Rfl:     No Known Allergies    Family History   Problem Relation Age of Onset   • Heart disease Father    • Diabetes Father    • Parkinsonism Father    • Heart attack Father        Past Surgical History:   Procedure Laterality Date   • CATARACT EXTRACTION, BILATERAL Bilateral 6/22/21,  6/29/2021   • EYE SURGERY         Past Medical History:   Diagnosis Date   • Atrial fibrillation    • Diabetes    • Dysphonia of organic tremor    • HTN (hypertension)    • Hyperlipidemia    • Hypertension    • Macrocytosis    • Renal insufficiency        Family History   Problem Relation Age of Onset   • Heart disease Father    • Diabetes Father    • Parkinsonism Father    • Heart attack Father        Social History     Socioeconomic History   • Marital status:    Tobacco Use   • Smoking status: Former   • Smokeless tobacco: Never   Vaping Use   • Vaping Use: Never used   Substance and Sexual Activity   • Alcohol use: No   • Drug use: No   • Sexual activity: Yes     Partners: Female           ECG 12 Lead    Date/Time: 11/9/2023 10:55 AM  Performed by: Willi Frederick MD    Authorized by: Willi Frederick MD  Comparison: compared with previous ECG   Similar to previous ECG  Comparison to previous ECG: Normal sinus rhythm left anterior fascicular block 82/min nonspecific ST-T wave changes poor R wave progression anteriorly no ectopy no significant change from previous EKG.        Objective:       Physical Exam    There  were no vitals taken for this visit.  The patient is alert, oriented and in no distress.    Vital signs as noted above.    Head and neck revealed no carotid bruits or jugular venous distension.  No thyromegaly or lymphadenopathy is present.    Lungs clear.  No wheezing.  Breath sounds are normal bilaterally.    Heart normal first and second heart sounds.  No murmur..  No pericardial rub is present.  No gallop is present.    Abdomen soft and nontender.  No organomegaly is present.    Extremities revealed good peripheral pulses without any pedal edema.    Skin warm and dry.    Musculoskeletal system is grossly normal.    CNS grossly normal.    Reviewed and updated.

## 2023-11-09 ENCOUNTER — OFFICE VISIT (OUTPATIENT)
Dept: CARDIOLOGY | Facility: CLINIC | Age: 73
End: 2023-11-09
Payer: MEDICARE

## 2023-11-09 VITALS
OXYGEN SATURATION: 97 % | HEIGHT: 66 IN | BODY MASS INDEX: 33.91 KG/M2 | HEART RATE: 80 BPM | WEIGHT: 211 LBS | DIASTOLIC BLOOD PRESSURE: 77 MMHG | SYSTOLIC BLOOD PRESSURE: 130 MMHG

## 2023-11-09 DIAGNOSIS — I10 ESSENTIAL HYPERTENSION: ICD-10-CM

## 2023-11-09 DIAGNOSIS — E78.2 MIXED HYPERLIPIDEMIA: ICD-10-CM

## 2023-11-09 DIAGNOSIS — E11.9 TYPE 2 DIABETES MELLITUS WITHOUT COMPLICATION, WITHOUT LONG-TERM CURRENT USE OF INSULIN: ICD-10-CM

## 2023-11-09 DIAGNOSIS — I48.0 PAROXYSMAL ATRIAL FIBRILLATION: Primary | ICD-10-CM

## 2023-11-09 DIAGNOSIS — R06.02 SHORTNESS OF BREATH: ICD-10-CM

## 2024-05-08 NOTE — PROGRESS NOTES
Encounter Date:05/21/2024  Last seen 11/9/2023      Patient ID: Tone Weber is a 73 y.o. male.    Chief Complaint:     History of atrial fibrillation  Hypertension  Dyslipidemia  History of positive COVID March 2020     History of Present Illness    Since I have last seen, the patient has been without any chest discomfort , unusual shortness of breath, palpitations, dizziness or syncope.  Denies having any headache ,abdominal pain ,nausea, vomiting , diarrhea constipation, loss of weight or loss of appetite.  Denies having any excessive bruising ,hematuria or blood in the stool.    Review of all systems negative except as indicated.    Reviewed ROS.  Assessment and Plan      ]]]]]]]]]]]]]]]]]]  History  =========  - History of atrial fibrillation in the setting of pneumonia.  Patient is maintaining sinus rhythm.-EKG 4/20/2023  Sinus rhythm-5/21/2023-EKG     -Shortness of breath with exertion-improved     Echocardiogram 6/16/2020-normal  Lexiscan Cardiolite test-normal 7/7/2020     - History of COVID positive pneumonia-improved.  March 2020     -Hypertension dyslipidemia diabetes     -Family history is positive for coronary artery disease.     -Former smoker     -No known allergies     -No significant surgical problems in the past.  =======  Plan  =======  History of shortness of breath-better but still present.  Patient has shortness of breath since he had COVID infection.     History of atrial fibrillation-maintaining sinus rhythm.  EKG showed sinus rhythm-  4/20/2023  EKG 11/9/2023 showed sinus rhythm left anterior fascicular block  EKG-5/21/2024-sinus rhythm left anterior fascicular block.    [id number] score-3  Patient did not have any recurrence of atrial fibrillation presence of pneumonia in the past.  No anticoagulation at this time (weighing the risks and benefits).     Hypertension-controlled  129/78     Dyslipidemia-continue simvastatin     Medications were reviewed and updated.     Follow-up in the  office in 1 year.    Further plan will depend on patient's progress.     Reviewed and updated 5/21/2024.  ]]]]]]]]]]]]]]]]]            Diagnosis Plan   1. Paroxysmal atrial fibrillation        2. Mixed hyperlipidemia        3. Essential hypertension        4. Type 2 diabetes mellitus without complication, without long-term current use of insulin        5. Shortness of breath        LAB RESULTS (LAST 7 DAYS)    CBC        BMP        CMP         BNP        TROPONIN        CoAg        Creatinine Clearance  CrCl cannot be calculated (Patient's most recent lab result is older than the maximum 30 days allowed.).    ABG        Radiology  No radiology results for the last day                The following portions of the patient's history were reviewed and updated as appropriate: allergies, current medications, past family history, past medical history, past social history, past surgical history, and problem list.    Review of Systems   Constitutional: Negative for malaise/fatigue.   Cardiovascular:  Positive for dyspnea on exertion. Negative for chest pain, leg swelling and palpitations.   Respiratory:  Negative for cough and shortness of breath.    Gastrointestinal:  Negative for abdominal pain, nausea and vomiting.   Neurological:  Negative for dizziness, focal weakness, headaches, light-headedness and numbness.   All other systems reviewed and are negative.      Current Outpatient Medications:   •  aspirin 81 MG chewable tablet, Chew 1 tablet Every Other Day., Disp: , Rfl:   •  azelastine (ASTELIN) 0.1 % nasal spray, 2 sprays into the nostril(s) as directed by provider 2 (Two) Times a Day. Use in each nostril as directed, Disp: 30 mL, Rfl: 0  •  coenzyme Q10 100 MG capsule, Take 1 capsule by mouth Daily., Disp: , Rfl:   •  ezetimibe (ZETIA) 10 MG tablet, Take 1 tablet by mouth Daily., Disp: , Rfl: 1  •  Fexofenadine HCl (ALLEGRA ALLERGY PO), Take  by mouth., Disp: , Rfl:   •  ibuprofen (ADVIL,MOTRIN) 200 MG tablet, Take  1 tablet by mouth Every 6 (Six) Hours As Needed for Mild Pain., Disp: , Rfl:   •  lisinopril (PRINIVIL,ZESTRIL) 10 MG tablet, Take 1 tablet by mouth Daily., Disp: , Rfl: 1  •  metFORMIN (GLUCOPHAGE) 1000 MG tablet, Take 1 tablet by mouth 2 (Two) Times a Day With Meals., Disp: , Rfl:   •  Multiple Vitamins-Minerals (MULTIVITAMIN WITH MINERALS) tablet tablet, Take 1 tablet by mouth Daily., Disp: , Rfl:   •  primidone (MYSOLINE) 50 MG tablet, TAKE two TABLET BY MOUTH NIGHTLY, Disp: 180 tablet, Rfl: 3  •  simvastatin (ZOCOR) 40 MG tablet, Take 1 tablet by mouth Daily., Disp: , Rfl: 2  •  Triamcinolone Acetonide (NASACORT) 55 MCG/ACT nasal inhaler, 2 sprays into the nostril(s) as directed by provider Daily., Disp: , Rfl:     No Known Allergies    Family History   Problem Relation Age of Onset   • Heart disease Father    • Diabetes Father    • Parkinsonism Father    • Heart attack Father        Past Surgical History:   Procedure Laterality Date   • CATARACT EXTRACTION, BILATERAL Bilateral 6/22/21,  6/29/2021   • EYE SURGERY         Past Medical History:   Diagnosis Date   • Atrial fibrillation    • Diabetes    • Dysphonia of organic tremor    • HTN (hypertension)    • Hyperlipidemia    • Hypertension    • Macrocytosis    • Renal insufficiency        Family History   Problem Relation Age of Onset   • Heart disease Father    • Diabetes Father    • Parkinsonism Father    • Heart attack Father        Social History     Socioeconomic History   • Marital status:    Tobacco Use   • Smoking status: Former   • Smokeless tobacco: Never   Vaping Use   • Vaping status: Never Used   Substance and Sexual Activity   • Alcohol use: No   • Drug use: No   • Sexual activity: Yes     Partners: Female           ECG 12 Lead    Date/Time: 5/21/2024 1:39 PM  Performed by: Willi Frederick MD    Authorized by: Willi Frederick MD  Comparison: compared with previous ECG   Similar to previous ECG  Comparison to previous ECG: Normal sinus  rhythm left anterior fascicular block 80/min nonspecific ST-T wave changes no ectopy no significant change from previous EKG.        Objective:       Physical Exam    There were no vitals taken for this visit.  The patient is alert, oriented and in no distress.    Vital signs as noted above.    Head and neck revealed no carotid bruits or jugular venous distension.  No thyromegaly or lymphadenopathy is present.    Lungs clear.  No wheezing.  Breath sounds are normal bilaterally.    Heart normal first and second heart sounds.  No murmur..  No pericardial rub is present.  No gallop is present.    Abdomen soft and nontender.  No organomegaly is present.    Extremities revealed good peripheral pulses without any pedal edema.    Skin warm and dry.    Musculoskeletal system is grossly normal.    CNS grossly normal.    Reviewed and updated.

## 2024-05-21 ENCOUNTER — OFFICE VISIT (OUTPATIENT)
Dept: CARDIOLOGY | Facility: CLINIC | Age: 74
End: 2024-05-21
Payer: MEDICARE

## 2024-05-21 VITALS
WEIGHT: 209 LBS | HEART RATE: 81 BPM | SYSTOLIC BLOOD PRESSURE: 129 MMHG | BODY MASS INDEX: 33.59 KG/M2 | DIASTOLIC BLOOD PRESSURE: 78 MMHG | HEIGHT: 66 IN | OXYGEN SATURATION: 98 %

## 2024-05-21 DIAGNOSIS — E78.2 MIXED HYPERLIPIDEMIA: ICD-10-CM

## 2024-05-21 DIAGNOSIS — E11.9 TYPE 2 DIABETES MELLITUS WITHOUT COMPLICATION, WITHOUT LONG-TERM CURRENT USE OF INSULIN: ICD-10-CM

## 2024-05-21 DIAGNOSIS — R06.02 SHORTNESS OF BREATH: ICD-10-CM

## 2024-05-21 DIAGNOSIS — I10 ESSENTIAL HYPERTENSION: ICD-10-CM

## 2024-05-21 DIAGNOSIS — I48.0 PAROXYSMAL ATRIAL FIBRILLATION: Primary | ICD-10-CM

## 2024-09-02 PROBLEM — R09.81 CONGESTION OF NASAL SINUS: Status: ACTIVE | Noted: 2024-09-02

## 2024-09-02 PROBLEM — J02.9 SORE THROAT: Status: ACTIVE | Noted: 2024-09-02

## 2024-09-06 DIAGNOSIS — G25.0 ESSENTIAL TREMOR: ICD-10-CM

## 2024-09-06 RX ORDER — PRIMIDONE 50 MG/1
TABLET ORAL
Qty: 180 TABLET | Refills: 3 | Status: SHIPPED | OUTPATIENT
Start: 2024-09-06

## 2024-09-16 ENCOUNTER — OFFICE VISIT (OUTPATIENT)
Dept: NEUROLOGY | Facility: CLINIC | Age: 74
End: 2024-09-16
Payer: MEDICARE

## 2024-09-16 ENCOUNTER — TELEPHONE (OUTPATIENT)
Dept: NEUROLOGY | Facility: CLINIC | Age: 74
End: 2024-09-16

## 2024-09-16 VITALS
BODY MASS INDEX: 32.78 KG/M2 | WEIGHT: 204 LBS | HEIGHT: 66 IN | HEART RATE: 89 BPM | SYSTOLIC BLOOD PRESSURE: 163 MMHG | DIASTOLIC BLOOD PRESSURE: 78 MMHG

## 2024-09-16 DIAGNOSIS — G25.0 ESSENTIAL TREMOR: ICD-10-CM

## 2024-09-16 PROCEDURE — 3077F SYST BP >= 140 MM HG: CPT | Performed by: PSYCHIATRY & NEUROLOGY

## 2024-09-16 PROCEDURE — 3078F DIAST BP <80 MM HG: CPT | Performed by: PSYCHIATRY & NEUROLOGY

## 2024-09-16 PROCEDURE — 1160F RVW MEDS BY RX/DR IN RCRD: CPT | Performed by: PSYCHIATRY & NEUROLOGY

## 2024-09-16 PROCEDURE — 1159F MED LIST DOCD IN RCRD: CPT | Performed by: PSYCHIATRY & NEUROLOGY

## 2024-09-16 PROCEDURE — 99214 OFFICE O/P EST MOD 30 MIN: CPT | Performed by: PSYCHIATRY & NEUROLOGY

## 2024-09-16 RX ORDER — PRIMIDONE 50 MG/1
TABLET ORAL
Qty: 270 TABLET | Refills: 3 | Status: SHIPPED | OUTPATIENT
Start: 2024-09-16

## 2025-05-16 NOTE — PROGRESS NOTES
Encounter Date:05/21/2025  Last seen 5/21/2024.      Patient ID: Tone Weber is a 74 y.o. male.    Chief Complaint:     History of atrial fibrillation  Hypertension  Dyslipidemia    History of Present Illness     Since I have last seen, the patient has been without any chest discomfort ,shortness of breath, palpitations, dizziness or syncope.  Denies having any headache ,abdominal pain ,nausea, vomiting , diarrhea constipation, loss of weight or loss of appetite.  Denies having any excessive bruising ,hematuria or blood in the stool.    Review of all systems negative except as indicated.    Reviewed ROS.    Assessment and Plan      ]]]]]]]]]]]]]]]]]]  History  =========  - History of atrial fibrillation in the setting of pneumonia.  Patient is maintaining sinus rhythm.-EKG 4/20/2023  Sinus rhythm-5/21/2023-EKG     -Shortness of breath with exertion-improved     Echocardiogram 6/16/2020-normal  Lexiscan Cardiolite test-normal 7/7/2020     - History of COVID positive pneumonia-improved.  March 2020     -Hypertension dyslipidemia diabetes     -Family history is positive for coronary artery disease.     -Former smoker     -No known allergies     -No significant surgical problems in the past.  =======  Plan  =======  Shortness of breath-better.    History of atrial fibrillation in the setting of pneumonia.  No further episodes.  Patient is maintaining sinus rhythm.    EKG 5/21/2025-sinus rhythm.  EKG showed sinus rhythm-  4/20/2023  EKG 11/9/2023 showed sinus rhythm left anterior fascicular block  EKG-5/21/2024-sinus rhythm left anterior fascicular block.     [id number] score-3  Patient did not have any recurrence of atrial fibrillation presence of pneumonia in the past.  No anticoagulation at this time (weighing the risks and benefits).     Hypertension-controlled  137/75.    Dyslipidemia-continue simvastatin    Follow-up in the office in 1 year.    Further plan will depend on patient's progress.    Reviewed and  updated 5/21/2025.    ]]]]]]]]]]]]]]]]]        Diagnosis Plan   1. Paroxysmal atrial fibrillation        2. Type 2 diabetes mellitus without complication, without long-term current use of insulin        3. Mixed hyperlipidemia        4. Shortness of breath        5. Essential hypertension        LAB RESULTS (LAST 7 DAYS)    CBC        BMP        CMP         BNP        TROPONIN        CoAg        Creatinine Clearance  CrCl cannot be calculated (Patient's most recent lab result is older than the maximum 30 days allowed.).    ABG        Radiology  No radiology results for the last day                The following portions of the patient's history were reviewed and updated as appropriate: allergies, current medications, past family history, past medical history, past social history, past surgical history, and problem list.    Review of Systems   Constitutional: Negative for malaise/fatigue.   Cardiovascular:  Negative for chest pain, leg swelling, palpitations and syncope.   Respiratory:  Positive for shortness of breath.    Skin:  Negative for rash.   Gastrointestinal:  Negative for nausea and vomiting.   Neurological:  Negative for dizziness, light-headedness and numbness.   All other systems reviewed and are negative.        Current Outpatient Medications:     aspirin 81 MG chewable tablet, Chew 1 tablet Every Other Day., Disp: , Rfl:     azelastine (ASTELIN) 0.1 % nasal spray, 2 sprays into the nostril(s) as directed by provider 2 (Two) Times a Day. Use in each nostril as directed, Disp: 30 mL, Rfl: 0    coenzyme Q10 100 MG capsule, Take 1 capsule by mouth Daily., Disp: , Rfl:     ezetimibe (ZETIA) 10 MG tablet, Take 1 tablet by mouth Daily., Disp: , Rfl: 1    Fexofenadine HCl (ALLEGRA ALLERGY PO), Take  by mouth., Disp: , Rfl:     ibuprofen (ADVIL,MOTRIN) 200 MG tablet, Take 1 tablet by mouth Every 6 (Six) Hours As Needed for Mild Pain., Disp: , Rfl:     lisinopril (PRINIVIL,ZESTRIL) 10 MG tablet, Take 1 tablet by  mouth Daily., Disp: , Rfl: 1    metFORMIN (GLUCOPHAGE) 1000 MG tablet, Take 1 tablet by mouth 2 (Two) Times a Day With Meals., Disp: , Rfl:     Multiple Vitamins-Minerals (MULTIVITAMIN WITH MINERALS) tablet tablet, Take 1 tablet by mouth Daily., Disp: , Rfl:     primidone (MYSOLINE) 50 MG tablet, TAKE TWO TABLETS BY MOUTH ONCE NIGHTLY, Disp: 270 tablet, Rfl: 3    simvastatin (ZOCOR) 40 MG tablet, Take 1 tablet by mouth Daily., Disp: , Rfl: 2    No Known Allergies    Family History   Problem Relation Age of Onset    Heart disease Father     Diabetes Father     Parkinsonism Father     Heart attack Father        Past Surgical History:   Procedure Laterality Date    CATARACT EXTRACTION, BILATERAL Bilateral 21,  2021    EYE SURGERY         Past Medical History:   Diagnosis Date    Atrial fibrillation     Diabetes     Dysphonia of organic tremor     HTN (hypertension)     Hyperlipidemia     Hypertension     Macrocytosis     Renal insufficiency        Family History   Problem Relation Age of Onset    Heart disease Father     Diabetes Father     Parkinsonism Father     Heart attack Father        Social History     Socioeconomic History    Marital status:    Tobacco Use    Smoking status: Former     Types: Cigarettes     Start date:      Quit date:      Years since quittin.4     Passive exposure: Past    Smokeless tobacco: Never   Vaping Use    Vaping status: Never Used   Substance and Sexual Activity    Alcohol use: No    Drug use: No    Sexual activity: Yes     Partners: Female           ECG 12 Lead    Date/Time: 2025 2:52 PM  Performed by: Willi Frederick MD    Authorized by: Willi Frederick MD  Comparison: compared with previous ECG   Similar to previous ECG  Comparison to previous ECG: Sinus rhythm nonspecific ST-T wave changes left anterior fascicular block 74/min no ectopy no significant change from previous EKG.        Objective:       Physical Exam    /75 (BP Location:  "Left arm, Patient Position: Sitting, Cuff Size: Adult)   Pulse 74   Ht 167.6 cm (66\")   Wt 94.8 kg (209 lb)   SpO2 98%   BMI 33.73 kg/m²   The patient is alert, oriented and in no distress.    Vital signs as noted above.    Head and neck revealed no carotid bruits or jugular venous distension.  No thyromegaly or lymphadenopathy is present.    Lungs clear.  No wheezing.  Breath sounds are normal bilaterally.    Heart normal first and second heart sounds.  No murmur..  No pericardial rub is present.  No gallop is present.    Abdomen soft and nontender.  No organomegaly is present.    Extremities revealed good peripheral pulses without any pedal edema.    Skin warm and dry.    Musculoskeletal system is grossly normal.    CNS grossly normal.    Reviewed and updated.          "

## 2025-05-21 ENCOUNTER — OFFICE VISIT (OUTPATIENT)
Dept: CARDIOLOGY | Facility: CLINIC | Age: 75
End: 2025-05-21
Payer: MEDICARE

## 2025-05-21 VITALS
SYSTOLIC BLOOD PRESSURE: 137 MMHG | HEART RATE: 74 BPM | BODY MASS INDEX: 33.59 KG/M2 | OXYGEN SATURATION: 98 % | HEIGHT: 66 IN | DIASTOLIC BLOOD PRESSURE: 75 MMHG | WEIGHT: 209 LBS

## 2025-05-21 DIAGNOSIS — I48.0 PAROXYSMAL ATRIAL FIBRILLATION: Primary | ICD-10-CM

## 2025-05-21 DIAGNOSIS — E11.9 TYPE 2 DIABETES MELLITUS WITHOUT COMPLICATION, WITHOUT LONG-TERM CURRENT USE OF INSULIN: ICD-10-CM

## 2025-05-21 DIAGNOSIS — R06.02 SHORTNESS OF BREATH: ICD-10-CM

## 2025-05-21 DIAGNOSIS — I10 ESSENTIAL HYPERTENSION: ICD-10-CM

## 2025-05-21 DIAGNOSIS — E78.2 MIXED HYPERLIPIDEMIA: ICD-10-CM
